# Patient Record
Sex: FEMALE | Race: WHITE | NOT HISPANIC OR LATINO | URBAN - METROPOLITAN AREA
[De-identification: names, ages, dates, MRNs, and addresses within clinical notes are randomized per-mention and may not be internally consistent; named-entity substitution may affect disease eponyms.]

---

## 2017-05-20 ENCOUNTER — EMERGENCY (EMERGENCY)
Facility: HOSPITAL | Age: 77
LOS: 1 days | Discharge: HOME | End: 2017-05-20
Attending: EMERGENCY MEDICINE
Payer: MEDICARE

## 2017-05-20 VITALS
SYSTOLIC BLOOD PRESSURE: 226 MMHG | TEMPERATURE: 98 F | HEART RATE: 83 BPM | DIASTOLIC BLOOD PRESSURE: 98 MMHG | OXYGEN SATURATION: 95 % | RESPIRATION RATE: 18 BRPM | WEIGHT: 145.06 LBS | HEIGHT: 63 IN

## 2017-05-20 DIAGNOSIS — Z90.89 ACQUIRED ABSENCE OF OTHER ORGANS: ICD-10-CM

## 2017-05-20 DIAGNOSIS — I10 ESSENTIAL (PRIMARY) HYPERTENSION: ICD-10-CM

## 2017-05-20 DIAGNOSIS — Z90.49 ACQUIRED ABSENCE OF OTHER SPECIFIED PARTS OF DIGESTIVE TRACT: ICD-10-CM

## 2017-05-20 DIAGNOSIS — Z88.0 ALLERGY STATUS TO PENICILLIN: ICD-10-CM

## 2017-05-20 DIAGNOSIS — Z88.8 ALLERGY STATUS TO OTHER DRUGS, MEDICAMENTS AND BIOLOGICAL SUBSTANCES STATUS: ICD-10-CM

## 2017-05-20 DIAGNOSIS — Z79.82 LONG TERM (CURRENT) USE OF ASPIRIN: ICD-10-CM

## 2017-05-20 DIAGNOSIS — R51 HEADACHE: ICD-10-CM

## 2017-05-20 DIAGNOSIS — Z98.890 OTHER SPECIFIED POSTPROCEDURAL STATES: ICD-10-CM

## 2017-05-20 LAB
ALBUMIN SERPL ELPH-MCNC: 4.5 G/DL — SIGNIFICANT CHANGE UP (ref 3.3–5.2)
ALP SERPL-CCNC: 98 U/L — SIGNIFICANT CHANGE UP (ref 40–120)
ALT FLD-CCNC: 38 U/L — HIGH
ANION GAP SERPL CALC-SCNC: 14 MMOL/L — SIGNIFICANT CHANGE UP (ref 5–17)
AST SERPL-CCNC: 28 U/L — SIGNIFICANT CHANGE UP
BASOPHILS # BLD AUTO: 0 K/UL — SIGNIFICANT CHANGE UP (ref 0–0.2)
BASOPHILS NFR BLD AUTO: 0.4 % — SIGNIFICANT CHANGE UP (ref 0–2)
BILIRUB SERPL-MCNC: 0.4 MG/DL — SIGNIFICANT CHANGE UP (ref 0.4–2)
BUN SERPL-MCNC: 25 MG/DL — HIGH (ref 8–20)
CALCIUM SERPL-MCNC: 9.8 MG/DL — SIGNIFICANT CHANGE UP (ref 8.6–10.2)
CHLORIDE SERPL-SCNC: 102 MMOL/L — SIGNIFICANT CHANGE UP (ref 98–107)
CO2 SERPL-SCNC: 26 MMOL/L — SIGNIFICANT CHANGE UP (ref 22–29)
CREAT SERPL-MCNC: 0.85 MG/DL — SIGNIFICANT CHANGE UP (ref 0.5–1.3)
EOSINOPHIL # BLD AUTO: 0.3 K/UL — SIGNIFICANT CHANGE UP (ref 0–0.5)
EOSINOPHIL NFR BLD AUTO: 3.6 % — SIGNIFICANT CHANGE UP (ref 0–6)
GLUCOSE SERPL-MCNC: 120 MG/DL — HIGH (ref 70–115)
HCT VFR BLD CALC: 42.5 % — SIGNIFICANT CHANGE UP (ref 37–47)
HGB BLD-MCNC: 14.2 G/DL — SIGNIFICANT CHANGE UP (ref 12–16)
LYMPHOCYTES # BLD AUTO: 2.3 K/UL — SIGNIFICANT CHANGE UP (ref 1–4.8)
LYMPHOCYTES # BLD AUTO: 29.3 % — SIGNIFICANT CHANGE UP (ref 20–55)
MCHC RBC-ENTMCNC: 30.1 PG — SIGNIFICANT CHANGE UP (ref 27–31)
MCHC RBC-ENTMCNC: 33.4 G/DL — SIGNIFICANT CHANGE UP (ref 32–36)
MCV RBC AUTO: 90.2 FL — SIGNIFICANT CHANGE UP (ref 81–99)
MONOCYTES # BLD AUTO: 0.9 K/UL — HIGH (ref 0–0.8)
MONOCYTES NFR BLD AUTO: 10.9 % — HIGH (ref 3–10)
NEUTROPHILS # BLD AUTO: 4.4 K/UL — SIGNIFICANT CHANGE UP (ref 1.8–8)
NEUTROPHILS NFR BLD AUTO: 55.5 % — SIGNIFICANT CHANGE UP (ref 37–73)
PLATELET # BLD AUTO: 214 K/UL — SIGNIFICANT CHANGE UP (ref 150–400)
POTASSIUM SERPL-MCNC: 3.6 MMOL/L — SIGNIFICANT CHANGE UP (ref 3.5–5.3)
POTASSIUM SERPL-SCNC: 3.6 MMOL/L — SIGNIFICANT CHANGE UP (ref 3.5–5.3)
PROT SERPL-MCNC: 6.8 G/DL — SIGNIFICANT CHANGE UP (ref 6.6–8.7)
RBC # BLD: 4.71 M/UL — SIGNIFICANT CHANGE UP (ref 4.4–5.2)
RBC # FLD: 14 % — SIGNIFICANT CHANGE UP (ref 11–15.6)
SODIUM SERPL-SCNC: 142 MMOL/L — SIGNIFICANT CHANGE UP (ref 135–145)
TROPONIN T SERPL-MCNC: <0.01 NG/ML — SIGNIFICANT CHANGE UP (ref 0–0.06)
WBC # BLD: 7.9 K/UL — SIGNIFICANT CHANGE UP (ref 4.8–10.8)
WBC # FLD AUTO: 7.9 K/UL — SIGNIFICANT CHANGE UP (ref 4.8–10.8)

## 2017-05-20 PROCEDURE — 99285 EMERGENCY DEPT VISIT HI MDM: CPT

## 2017-05-20 PROCEDURE — 93010 ELECTROCARDIOGRAM REPORT: CPT

## 2017-05-20 NOTE — ED PROVIDER NOTE - PROGRESS NOTE DETAILS
pt had transient lightheadedness after getting hydralazine 50mg so pt advised to resume current med dosage and f/u cardio for repeat BP to see if pt needs med adjstment. pt advised to be admitted for increased qtc interval despite being asymptomatic but pt still refuses. pt walking without lightheadedness. pt has no si/sx of arrhythmia but pt was advised to f/u for prolonged qtc of 506ms and discuss continued beta blocker use with cardio. will d/c home. pt advised she could die from arrhythmia

## 2017-05-20 NOTE — ED PROVIDER NOTE - OBJECTIVE STATEMENT
76 y/o F pt w/ PMHx of HTN presents to the ED c/o headache onset at 17:00 today. Pt ate something but still did not feel right. Pt took her BP at 20:00 and noted it to be 200/70; pt took 2 additional Hydralazine to no relief. Pt's headache has improved. Pt also notes GOTTI today while walking up stairs. Pt denies chest pain, numbness/tingling, focal weakness, blurred vision, slurred speech, or any other complaints. 78 y/o F pt w/ PMHx of HTN presents to the ED c/o headache onset at 17:00 today. Pt ate something but still did not feel right. Pt took her BP at 20:00 and noted it to be 200/70; pt took 2 additional Hydralazine to no relief. Pt's headache has improved. Pt also notes GOTTI today while walking up stairs. Pt denies chest pain, numbness/tingling, focal weakness, blurred vision, slurred speech, or any other complaints. Pt did not take her nightly BP meds.

## 2017-05-20 NOTE — ED PROVIDER NOTE - NS ED MD SCRIBE ATTENDING SCRIBE SECTIONS
PHYSICAL EXAM/PAST MEDICAL/SURGICAL/SOCIAL HISTORY/VITAL SIGNS( Pullset)/HISTORY OF PRESENT ILLNESS/REVIEW OF SYSTEMS/DISPOSITION

## 2017-05-20 NOTE — ED PROVIDER NOTE - CARE PLAN
Principal Discharge DX:	Nonintractable headache, unspecified chronicity pattern, unspecified headache type  Secondary Diagnosis:	Essential hypertension

## 2017-05-21 VITALS
OXYGEN SATURATION: 95 % | TEMPERATURE: 97 F | DIASTOLIC BLOOD PRESSURE: 66 MMHG | SYSTOLIC BLOOD PRESSURE: 154 MMHG | RESPIRATION RATE: 16 BRPM | HEART RATE: 71 BPM

## 2017-05-21 DIAGNOSIS — Z92.89 PERSONAL HISTORY OF OTHER MEDICAL TREATMENT: Chronic | ICD-10-CM

## 2017-05-21 DIAGNOSIS — Z90.49 ACQUIRED ABSENCE OF OTHER SPECIFIED PARTS OF DIGESTIVE TRACT: Chronic | ICD-10-CM

## 2017-05-21 PROCEDURE — 70496 CT ANGIOGRAPHY HEAD: CPT | Mod: 26

## 2017-05-21 PROCEDURE — 85027 COMPLETE CBC AUTOMATED: CPT

## 2017-05-21 PROCEDURE — 99284 EMERGENCY DEPT VISIT MOD MDM: CPT | Mod: 25

## 2017-05-21 PROCEDURE — 70496 CT ANGIOGRAPHY HEAD: CPT

## 2017-05-21 PROCEDURE — 84484 ASSAY OF TROPONIN QUANT: CPT

## 2017-05-21 PROCEDURE — 93005 ELECTROCARDIOGRAM TRACING: CPT

## 2017-05-21 PROCEDURE — 71010: CPT | Mod: 26

## 2017-05-21 PROCEDURE — 71045 X-RAY EXAM CHEST 1 VIEW: CPT

## 2017-05-21 PROCEDURE — 80053 COMPREHEN METABOLIC PANEL: CPT

## 2017-05-21 RX ORDER — LABETALOL HCL 100 MG
200 TABLET ORAL ONCE
Qty: 0 | Refills: 0 | Status: COMPLETED | OUTPATIENT
Start: 2017-05-21 | End: 2017-05-21

## 2017-05-21 RX ORDER — HYDRALAZINE HCL 50 MG
50 TABLET ORAL ONCE
Qty: 0 | Refills: 0 | Status: COMPLETED | OUTPATIENT
Start: 2017-05-21 | End: 2017-05-21

## 2017-05-21 RX ADMIN — Medication 50 MILLIGRAM(S): at 02:07

## 2017-05-21 RX ADMIN — Medication 200 MILLIGRAM(S): at 02:07

## 2017-06-20 ENCOUNTER — INPATIENT (INPATIENT)
Facility: HOSPITAL | Age: 77
LOS: 0 days | Discharge: HOME | DRG: 287 | End: 2017-06-20
Attending: HOSPITALIST | Admitting: HOSPITALIST
Payer: COMMERCIAL

## 2017-06-20 VITALS
RESPIRATION RATE: 16 BRPM | DIASTOLIC BLOOD PRESSURE: 60 MMHG | SYSTOLIC BLOOD PRESSURE: 120 MMHG | OXYGEN SATURATION: 95 % | HEART RATE: 58 BPM | TEMPERATURE: 98 F

## 2017-06-20 VITALS
RESPIRATION RATE: 18 BRPM | TEMPERATURE: 98 F | HEIGHT: 63 IN | DIASTOLIC BLOOD PRESSURE: 83 MMHG | OXYGEN SATURATION: 97 % | SYSTOLIC BLOOD PRESSURE: 190 MMHG | HEART RATE: 70 BPM | WEIGHT: 145.06 LBS

## 2017-06-20 DIAGNOSIS — I20.0 UNSTABLE ANGINA: ICD-10-CM

## 2017-06-20 DIAGNOSIS — I20.9 ANGINA PECTORIS, UNSPECIFIED: ICD-10-CM

## 2017-06-20 DIAGNOSIS — Z90.49 ACQUIRED ABSENCE OF OTHER SPECIFIED PARTS OF DIGESTIVE TRACT: Chronic | ICD-10-CM

## 2017-06-20 DIAGNOSIS — E78.5 HYPERLIPIDEMIA, UNSPECIFIED: ICD-10-CM

## 2017-06-20 DIAGNOSIS — I10 ESSENTIAL (PRIMARY) HYPERTENSION: ICD-10-CM

## 2017-06-20 DIAGNOSIS — Z92.89 PERSONAL HISTORY OF OTHER MEDICAL TREATMENT: Chronic | ICD-10-CM

## 2017-06-20 LAB
ALBUMIN SERPL ELPH-MCNC: 4.4 G/DL — SIGNIFICANT CHANGE UP (ref 3.3–5.2)
ALP SERPL-CCNC: 88 U/L — SIGNIFICANT CHANGE UP (ref 40–120)
ALT FLD-CCNC: 189 U/L — HIGH
ANION GAP SERPL CALC-SCNC: 14 MMOL/L — SIGNIFICANT CHANGE UP (ref 5–17)
APTT BLD: 44.8 SEC — HIGH (ref 27.5–37.4)
AST SERPL-CCNC: 106 U/L — HIGH
BASOPHILS # BLD AUTO: 0 K/UL — SIGNIFICANT CHANGE UP (ref 0–0.2)
BASOPHILS NFR BLD AUTO: 0.4 % — SIGNIFICANT CHANGE UP (ref 0–2)
BILIRUB SERPL-MCNC: 0.5 MG/DL — SIGNIFICANT CHANGE UP (ref 0.4–2)
BUN SERPL-MCNC: 18 MG/DL — SIGNIFICANT CHANGE UP (ref 8–20)
CALCIUM SERPL-MCNC: 9.7 MG/DL — SIGNIFICANT CHANGE UP (ref 8.6–10.2)
CHLORIDE SERPL-SCNC: 101 MMOL/L — SIGNIFICANT CHANGE UP (ref 98–107)
CO2 SERPL-SCNC: 26 MMOL/L — SIGNIFICANT CHANGE UP (ref 22–29)
CREAT SERPL-MCNC: 0.87 MG/DL — SIGNIFICANT CHANGE UP (ref 0.5–1.3)
EOSINOPHIL # BLD AUTO: 0.1 K/UL — SIGNIFICANT CHANGE UP (ref 0–0.5)
EOSINOPHIL NFR BLD AUTO: 2.4 % — SIGNIFICANT CHANGE UP (ref 0–6)
GLUCOSE SERPL-MCNC: 101 MG/DL — SIGNIFICANT CHANGE UP (ref 70–115)
HCT VFR BLD CALC: 40 % — SIGNIFICANT CHANGE UP (ref 37–47)
HGB BLD-MCNC: 13 G/DL — SIGNIFICANT CHANGE UP (ref 12–16)
INR BLD: 0.96 RATIO — SIGNIFICANT CHANGE UP (ref 0.88–1.16)
LYMPHOCYTES # BLD AUTO: 1.2 K/UL — SIGNIFICANT CHANGE UP (ref 1–4.8)
LYMPHOCYTES # BLD AUTO: 23.3 % — SIGNIFICANT CHANGE UP (ref 20–55)
MCHC RBC-ENTMCNC: 29.2 PG — SIGNIFICANT CHANGE UP (ref 27–31)
MCHC RBC-ENTMCNC: 32.5 G/DL — SIGNIFICANT CHANGE UP (ref 32–36)
MCV RBC AUTO: 89.9 FL — SIGNIFICANT CHANGE UP (ref 81–99)
MONOCYTES # BLD AUTO: 0.5 K/UL — SIGNIFICANT CHANGE UP (ref 0–0.8)
MONOCYTES NFR BLD AUTO: 8.6 % — SIGNIFICANT CHANGE UP (ref 3–10)
NEUTROPHILS # BLD AUTO: 3.5 K/UL — SIGNIFICANT CHANGE UP (ref 1.8–8)
NEUTROPHILS NFR BLD AUTO: 65.1 % — SIGNIFICANT CHANGE UP (ref 37–73)
PLATELET # BLD AUTO: 175 K/UL — SIGNIFICANT CHANGE UP (ref 150–400)
POTASSIUM SERPL-MCNC: 4 MMOL/L — SIGNIFICANT CHANGE UP (ref 3.5–5.3)
POTASSIUM SERPL-SCNC: 4 MMOL/L — SIGNIFICANT CHANGE UP (ref 3.5–5.3)
PROT SERPL-MCNC: 6.4 G/DL — LOW (ref 6.6–8.7)
PROTHROM AB SERPL-ACNC: 10.6 SEC — SIGNIFICANT CHANGE UP (ref 9.8–12.7)
RBC # BLD: 4.45 M/UL — SIGNIFICANT CHANGE UP (ref 4.4–5.2)
RBC # FLD: 14 % — SIGNIFICANT CHANGE UP (ref 11–15.6)
SODIUM SERPL-SCNC: 141 MMOL/L — SIGNIFICANT CHANGE UP (ref 135–145)
TROPONIN T SERPL-MCNC: <0.01 NG/ML — SIGNIFICANT CHANGE UP (ref 0–0.06)
WBC # BLD: 5.4 K/UL — SIGNIFICANT CHANGE UP (ref 4.8–10.8)
WBC # FLD AUTO: 5.4 K/UL — SIGNIFICANT CHANGE UP (ref 4.8–10.8)

## 2017-06-20 PROCEDURE — 99223 1ST HOSP IP/OBS HIGH 75: CPT | Mod: AI

## 2017-06-20 PROCEDURE — 96374 THER/PROPH/DIAG INJ IV PUSH: CPT

## 2017-06-20 PROCEDURE — C1894: CPT

## 2017-06-20 PROCEDURE — 93005 ELECTROCARDIOGRAM TRACING: CPT

## 2017-06-20 PROCEDURE — 85610 PROTHROMBIN TIME: CPT

## 2017-06-20 PROCEDURE — 85730 THROMBOPLASTIN TIME PARTIAL: CPT

## 2017-06-20 PROCEDURE — C1887: CPT

## 2017-06-20 PROCEDURE — 84484 ASSAY OF TROPONIN QUANT: CPT

## 2017-06-20 PROCEDURE — 99284 EMERGENCY DEPT VISIT MOD MDM: CPT | Mod: 25

## 2017-06-20 PROCEDURE — 93010 ELECTROCARDIOGRAM REPORT: CPT

## 2017-06-20 PROCEDURE — 93458 L HRT ARTERY/VENTRICLE ANGIO: CPT

## 2017-06-20 PROCEDURE — 85027 COMPLETE CBC AUTOMATED: CPT

## 2017-06-20 PROCEDURE — 99285 EMERGENCY DEPT VISIT HI MDM: CPT

## 2017-06-20 PROCEDURE — 80053 COMPREHEN METABOLIC PANEL: CPT

## 2017-06-20 PROCEDURE — C1769: CPT

## 2017-06-20 PROCEDURE — C1760: CPT

## 2017-06-20 PROCEDURE — 71045 X-RAY EXAM CHEST 1 VIEW: CPT

## 2017-06-20 PROCEDURE — 71010: CPT | Mod: 26

## 2017-06-20 RX ORDER — CHOLECALCIFEROL (VITAMIN D3) 125 MCG
1 CAPSULE ORAL
Qty: 0 | Refills: 0 | COMMUNITY

## 2017-06-20 RX ORDER — HYDRALAZINE HCL 50 MG
5 TABLET ORAL ONCE
Qty: 0 | Refills: 0 | Status: COMPLETED | OUTPATIENT
Start: 2017-06-20 | End: 2017-06-20

## 2017-06-20 RX ORDER — POTASSIUM CHLORIDE 20 MEQ
3 PACKET (EA) ORAL
Qty: 0 | Refills: 0 | COMMUNITY

## 2017-06-20 RX ORDER — NITROGLYCERIN 6.5 MG
0.3 CAPSULE, EXTENDED RELEASE ORAL
Qty: 0 | Refills: 0 | Status: DISCONTINUED | OUTPATIENT
Start: 2017-06-20 | End: 2017-06-20

## 2017-06-20 RX ORDER — NITROGLYCERIN 6.5 MG
1 CAPSULE, EXTENDED RELEASE ORAL ONCE
Qty: 0 | Refills: 0 | Status: COMPLETED | OUTPATIENT
Start: 2017-06-20 | End: 2017-06-20

## 2017-06-20 RX ORDER — ASPIRIN/CALCIUM CARB/MAGNESIUM 324 MG
0 TABLET ORAL
Qty: 0 | Refills: 0 | COMMUNITY

## 2017-06-20 RX ORDER — ACETAMINOPHEN 500 MG
650 TABLET ORAL ONCE
Qty: 0 | Refills: 0 | Status: COMPLETED | OUTPATIENT
Start: 2017-06-20 | End: 2017-06-20

## 2017-06-20 RX ORDER — OMEGA-3 ACID ETHYL ESTERS 1 G
1 CAPSULE ORAL
Qty: 0 | Refills: 0 | COMMUNITY

## 2017-06-20 RX ORDER — UBIDECARENONE 100 MG
1 CAPSULE ORAL
Qty: 0 | Refills: 0 | COMMUNITY

## 2017-06-20 RX ORDER — OMEGA-3 ACID ETHYL ESTERS 1 G
0 CAPSULE ORAL
Qty: 0 | Refills: 0 | COMMUNITY

## 2017-06-20 RX ORDER — LABETALOL HCL 100 MG
1 TABLET ORAL
Qty: 0 | Refills: 0 | COMMUNITY

## 2017-06-20 RX ORDER — ENOXAPARIN SODIUM 100 MG/ML
40 INJECTION SUBCUTANEOUS EVERY 24 HOURS
Qty: 0 | Refills: 0 | Status: DISCONTINUED | OUTPATIENT
Start: 2017-06-20 | End: 2017-06-20

## 2017-06-20 RX ORDER — MAGNESIUM OXIDE 400 MG ORAL TABLET 241.3 MG
1 TABLET ORAL
Qty: 0 | Refills: 0 | COMMUNITY

## 2017-06-20 RX ORDER — POTASSIUM CHLORIDE 20 MEQ
0 PACKET (EA) ORAL
Qty: 0 | Refills: 0 | COMMUNITY

## 2017-06-20 RX ORDER — FAMOTIDINE 10 MG/ML
1 INJECTION INTRAVENOUS
Qty: 60 | Refills: 0 | OUTPATIENT
Start: 2017-06-20 | End: 2017-07-20

## 2017-06-20 RX ORDER — LABETALOL HCL 100 MG
200 TABLET ORAL
Qty: 0 | Refills: 0 | Status: DISCONTINUED | OUTPATIENT
Start: 2017-06-20 | End: 2017-06-20

## 2017-06-20 RX ORDER — ASPIRIN/CALCIUM CARB/MAGNESIUM 324 MG
325 TABLET ORAL DAILY
Qty: 0 | Refills: 0 | Status: DISCONTINUED | OUTPATIENT
Start: 2017-06-20 | End: 2017-06-20

## 2017-06-20 RX ORDER — SODIUM CHLORIDE 9 MG/ML
1000 INJECTION, SOLUTION INTRAVENOUS
Qty: 0 | Refills: 0 | Status: DISCONTINUED | OUTPATIENT
Start: 2017-06-20 | End: 2017-06-20

## 2017-06-20 RX ORDER — GARLIC 1000 MG
1 CAPSULE ORAL
Qty: 0 | Refills: 0 | COMMUNITY

## 2017-06-20 RX ORDER — HYDRALAZINE HCL 50 MG
20 TABLET ORAL
Qty: 0 | Refills: 0 | COMMUNITY

## 2017-06-20 RX ORDER — SODIUM CHLORIDE 9 MG/ML
3 INJECTION INTRAMUSCULAR; INTRAVENOUS; SUBCUTANEOUS ONCE
Qty: 0 | Refills: 0 | Status: COMPLETED | OUTPATIENT
Start: 2017-06-20 | End: 2017-06-20

## 2017-06-20 RX ORDER — LABETALOL HCL 100 MG
10 TABLET ORAL ONCE
Qty: 0 | Refills: 0 | Status: COMPLETED | OUTPATIENT
Start: 2017-06-20 | End: 2017-06-20

## 2017-06-20 RX ORDER — CANDESARTAN CILEXETIL 8 MG/1
1 TABLET ORAL
Qty: 0 | Refills: 0 | COMMUNITY

## 2017-06-20 RX ORDER — HYDRALAZINE HCL 50 MG
40 TABLET ORAL
Qty: 0 | Refills: 0 | COMMUNITY

## 2017-06-20 RX ADMIN — Medication 10 MILLIGRAM(S): at 13:13

## 2017-06-20 RX ADMIN — Medication 5 MILLIGRAM(S): at 15:57

## 2017-06-20 RX ADMIN — Medication 650 MILLIGRAM(S): at 23:02

## 2017-06-20 RX ADMIN — Medication 200 MILLIGRAM(S): at 17:23

## 2017-06-20 RX ADMIN — SODIUM CHLORIDE 3 MILLILITER(S): 9 INJECTION INTRAMUSCULAR; INTRAVENOUS; SUBCUTANEOUS at 13:15

## 2017-06-20 RX ADMIN — Medication 5 MILLIGRAM(S): at 17:03

## 2017-06-20 RX ADMIN — Medication 1 INCH(S): at 13:13

## 2017-06-20 NOTE — DISCHARGE NOTE ADULT - PATIENT PORTAL LINK FT
“You can access the Follow[employer] Patient Portal, offered by [facility], by registering with the following website: [email or web address]”

## 2017-06-20 NOTE — H&P ADULT - HISTORY OF PRESENT ILLNESS
77 year old female ex- smoker present with central chest pressure, 8/10, non-radiating, initially thought to be 'gas' pain and not relieved by eructation and defecation. Associated with dyspnea as well as dizziness upon arising. Duncan palpitation, cough, fever or chills.  Denies orthopnea, pedal edema or PND.

## 2017-06-20 NOTE — CONSULT NOTE ADULT - SUBJECTIVE AND OBJECTIVE BOX
[facility], [facility]                                   [address] [place] [address]                                                      PHONE: [phone]                                                         FAX: [phone]  [email or web address]  ---------------------------------------------------------------------------------------------------------------------------------    Reason for Consult: chest pain HTN     HPI:  ALICIA NAIDU is an 77y Female with history of anxiety HTN uncontrolled HLD not on statins family history of CAD SHANNAN who presents with chest pressure that started this am around 9 am pressure that lasted about 20 to 30min then two more episodes lasting about 10 to 15 minutes.  The chest pain was associated with dyspnea without orthopnea or PND.  She denies any back pain or any other CV symptoms      PAST MEDICAL & SURGICAL HISTORY:  High cholesterol  HTN (hypertension)  Sleep apnea: cpap at home uses at home  History of bowel resection  History of cholecystectomy  History of appendectomy      azithromycin (Hives; Rash)  Exforge (Hives; Rash)  statins (Short breath)      MEDICATIONS  (STANDING):    MEDICATIONS  (PRN):      Social History:  Cigarettes:     ex smoker               Alchohol:       none          Illicit Drug Abuse:  none    ROS: Negative other than as mentioned in HPI.    Vital Signs Last 24 Hrs  T(C): 36.5, Max: 36.7 (06-20 @ 10:47)  T(F): 97.7, Max: 98 (06-20 @ 10:47)  HR: 76 (60 - 76)  BP: 179/75 (176/77 - 190/83)  BP(mean): --  RR: 16 (16 - 18)  SpO2: 98% (97% - 98%)  ICU Vital Signs Last 24 Hrs  ALICIA SCHINDLAR  I&O's Detail    I&O's Summary    Drug Dosing Weight  ALICIA NAIDU      PHYSICAL EXAM:  General: Appears well developed, well nourished alert and cooperative.  HEENT: Head; normocephalic, atraumatic.  Eyes: Pupils reactive, cornea wnl.  Neck: Supple, no nodes adenopathy, no NVD or carotid bruit or thyromegaly.  CARDIOVASCULAR: Normal S1 and S2, No murmur, rub, gallop or lift.   LUNGS: No rales, rhonchi or wheeze. Normal breath sounds bilaterally.  ABDOMEN: Soft, nontender without mass or organomegaly. bowel sounds normoactive.  EXTREMITIES: No clubbing, cyanosis or edema. Distal pulses wnl.   SKIN: warm and dry with normal turgor.  NEURO: Alert/oriented x 3/normal motor exam. No pathologic reflexes.    PSYCH: normal affect.        LABS:                        13.0   5.4   )-----------( 175      ( 20 Jun 2017 12:11 )             40.0     06-20    141  |  101  |  18.0  ----------------------------<  101  4.0   |  26.0  |  0.87    Ca    9.7      20 Jun 2017 12:11    TPro  6.4<L>  /  Alb  4.4  /  TBili  0.5  /  DBili  x   /  AST  106<H>  /  ALT  189<H>  /  AlkPhos  88  06-20    ALICIA NAIDU  CARDIAC MARKERS ( 20 Jun 2017 12:11 )  x     / <0.01 ng/mL / x     / x     / x          PT/INR - ( 20 Jun 2017 12:11 )   PT: 10.6 sec;   INR: 0.96 ratio         PTT - ( 20 Jun 2017 12:11 )  PTT:44.8 sec      RADIOLOGY & ADDITIONAL STUDIES:    INTERPRETATION OF TELEMETRY (personally reviewed):    ECG: NSR multiple PVC     ECHO: normal EF without any sig valvular disease from stress ECHO findings 2014    STRESS TEST: stress ECHO normal but poor functional activity 8.6 METS 80% MPHR     CARDIAC CATHETERIZATION: 2001 was ok as per patient     Assessment and Plan:    In summary, ALICIA NAIDU is an 77y Female with past medical history significant for HTN ex smoker HLD dose not tolerate statin who presents with typical chest pain HTN urgency; EKG NSR PVC without clear evidence of ischemia     Plan  1.  Left heart cath   2.  Continue Labetalol 200 mg BID but increase Hydralazine to 50 mg TID   3.  Start Norvasc 2.5 mg po daily     Further recommendation pending [facility] findings

## 2017-06-20 NOTE — PATIENT PROFILE ADULT. - FUNCTIONAL SCREEN CURRENT LEVEL: TOILETING, MLM
What is Carpal Tunnel Syndrome (CTS)?  Carpal tunnel syndrome (CTS) is a problem that affects the wrist and hand. If you have CTS, tingling and numbness can make even simple tasks hard to do. But CTS can be treated, and your symptoms can be controlled.    Learning about carpal tunnel  The carpal tunnel is a narrow space inside the wrist that is surrounded by bone and ligament. This space lets certain tendons and a major nerve pass from the forearm into the hand. With CTS, the tendon sheaths may thicken and enlarge. This reduces the amount of space inside the carpal tunnel. As a result, the median nerve may be compressed.  The symptoms of CTS  Tingling and numbness are the most common symptoms of CTS. Some people also have hand pain or even a weakened . At first, symptoms may wake you up at night. Later, they may also occur during your daily routines. For instance, you may notice symptoms while you are driving or holding a newspaper. Your symptoms may become more severe over time.     Symptoms of CTS may keep you up at night.    Working with your doctor  Your doctor will perform an exam to learn more about your symptoms. Once your problem is diagnosed, you and your doctor can make a treatment plan. He or she may recommend wrist braces or splints, local corticosteroid injections, and/or surgery, depending on your history and the severity of your physical exam findings. If you have surgery, you are likely to go home the same day.    4348-0716 [employer]. [address] [place], [place] [id number]. All rights reserved. This information is not intended as a substitute for professional medical care. Always follow your healthcare professional's instructions.        Carpal Tunnel Syndrome    Carpal tunnel syndrome is a painful condition of the wrist and arm. It is caused by pressure on the median nerve.  The median nerve is one of the nerves that give feeling and movement to the hand. It passes through a  tunnel in the wrist called the carpal tunnel. This tunnel is made up of bones and ligaments. Narrowing of this tunnel or swelling of the tissues inside the tunnel puts pressure on the median nerve. This causes numbness, pins and needles, or electric shooting pains in your hand and forearm. Often the pain is worse at night and may wake you when you are asleep.  Carpal tunnel syndrome may occur during pregnancy and with use of birth control pills. It is more common in workers who must often bend their wrists. It is also common in people who work with power tools that cause strong vibrations.  Home care    Rest the painful wrist. Avoid repeated bending of the wrist back and forth. This puts pressure on the median nerve. Avoid using power tools with strong vibrations.    If you were given a splint, wear it at night while you sleep. You may also wear it during the day for comfort.    Move your fingers and wrists often to avoid stiffness.    Elevate your arms on pillows when you lie down.    Try using the unaffected hand more.    Try not to hold your wrists in a bent, downward position.    Sometimes changes in the work place may ease symptoms. If you type most of the day, it may help to change the position of your keyboard or add a wrist support. Your wrist should be in a neutral position and not bent back when typing.    You may use over-the-counter pain medicine to treat pain and inflammation, unless another medicine was prescribed. Anti-inflammatory pain medicines, such as ibuprofen or naproxen may be more effective than acetaminophen, which treats pain, but not inflammation. If you have chronic liver or kidney disease or ever had a stomach ulcer or GI bleeding, talk with your doctor before using these medicines.    Opioid pain medicine will only give temporary relief and does not treat the problem. If pain continues, you may need a shot of a steroid drug into your wrist.    If the above methods fail, you may need  surgery. This will open the carpal tunnel and release the pressure on the trapped nerve.  Follow-up care  Follow up with your healthcare provider, or as advised, if the pain doesn t begin to improve within the next week.  If X-rays were taken, you will be notified of any new findings that may affect your care.  When to seek medical advice  Call your healthcare provider right away if any of these occur:    Pain not improving with the above treatment    Fingers or hand become cold, blue, numb, or tingly    Your whole arm becomes swollen or weak    5310-4997 [employer]. [address] [place] [address]. All rights reserved. This information is not intended as a substitute for professional medical care. Always follow your healthcare professional's instructions.        Carpal Tunnel Syndrome Prevention Tips  Some repetitive hand activities put you at higher risk for carpal tunnel syndrome (CTS). But you can reduce your risk. Learn how to change the way you use your hands. Below are tips for at home and on the job. Be sure to also follow the hand and wrist safety policies at your workplace.  Keep your wrist in neutral    Keep a neutral (straight) wrist position as often as you can. Don t use your wrist in a bent (flexed) position for long periods of time. This includes extended or twisted positions.  Watch your   Don t just use your thumb and index finger to grasp or lift. This can put stress on your wrist. When you can, use your whole hand and all its fingers to grasp an object.  Minimize repetition  Don t move your arms or hands or hold an object in the same way for long periods of time. Even simple, light tasks can cause injury this way. Instead, alternate tasks or switch hands.  Rest your hands  Give your hands a break from time to time with a rest. Even a few minutes once an hour can help.  Reduce speed and force  Slow down the speed in which you do a forceful, repetitive motion. This  gives your wrist time to recover from the effort. Use power tools to help reduce the force.  Strengthen the muscles     Keep your wrist in a neutral (straight) position when exercising.     Weak muscles may lead to a poor wrist or arm position. Exercises will make your hand and arm muscles stronger. This can help you keep a better position.    0958-8419 [employer]. [address] [place] [address], [place] [address]. All rights reserved. This information is not intended as a substitute for professional medical care. Always follow your healthcare professional's instructions.         (0) independent

## 2017-06-20 NOTE — DISCHARGE NOTE ADULT - PLAN OF CARE
optimize cardiac health No heavy lifting, driving, sex, tub baths, swimming, or any activity that submerges the lower half of the body in water for 48 hours.  Limited walking and stairs for 48 hours.    Change the bandaid after 24 hours and every 24 hours after that.  Keep the puncture site dry and covered with a bandaid until a scab forms.    Observe the site frequently.  If bleeding or a large lump (the size of a golf ball or bigger) occurs lie flat, apply continuous direct pressure just above the puncture site for at least 10 minutes, and notify your physician immediately.  If the bleeding cannot be controlled, call 911 immediately for assistance.  Notify your physician of pain, swelling or any drainage.    Notify your physician immediately if coldness, numbness, discoloration or pain in your foot occurs.

## 2017-06-20 NOTE — ED PROVIDER NOTE - CHPI ED SYMPTOMS NEG
no vomiting/no syncope/no diaphoresis/no back pain/no fever/no chills/no nausea/no cough/no dizziness

## 2017-06-20 NOTE — DISCHARGE NOTE ADULT - CARE PLAN
Principal Discharge DX:	Angina pectoris  Goal:	optimize cardiac health  Instructions for follow-up, activity and diet:	No heavy lifting, driving, sex, tub baths, swimming, or any activity that submerges the lower half of the body in water for 48 hours.  Limited walking and stairs for 48 hours.    Change the bandaid after 24 hours and every 24 hours after that.  Keep the puncture site dry and covered with a bandaid until a scab forms.    Observe the site frequently.  If bleeding or a large lump (the size of a golf ball or bigger) occurs lie flat, apply continuous direct pressure just above the puncture site for at least 10 minutes, and notify your physician immediately.  If the bleeding cannot be controlled, call 911 immediately for assistance.  Notify your physician of pain, swelling or any drainage.    Notify your physician immediately if coldness, numbness, discoloration or pain in your foot occurs.

## 2017-06-20 NOTE — H&P ADULT - ASSESSMENT
77 year old female ex-smoker with PMHx HTN, Dyslipidemia presenting with chest pressure suggestive of angina 77 year old female ex-smoker with PMHx HTN, Dyslipidemia presenting with chest pressure suggestive of angina. Negative Troponin EKG in ER.

## 2017-06-20 NOTE — DISCHARGE NOTE ADULT - CARE PROVIDER_API CALL
Murali Molina (MD), Cardiovascular Disease; Interventional Cardiology  [address] [place] [address]  Phone: [phone]  Fax: [phone]

## 2017-06-20 NOTE — ED ADULT TRIAGE NOTE - CHIEF COMPLAINT QUOTE
BIBA, patient reports substernal epigastric chest pain described as 8-9/10 pressure that began at 0930, subsided en route to ER. Hx HTN. EMS administered ASA 324mg en route

## 2017-06-20 NOTE — ED ADULT NURSE NOTE - OBJECTIVE STATEMENT
78 y/o female presents to ed reporting midsternal chest pain/chest pressure that started at approximately 0930 this morning. Pain started while ambulating. Reports that pain felt like indigestion unrelieved by belching and bowel movement. Patient reports no associated symptoms of radiating pain/diaphoresis/nausea. Patient reports was here and had bp meds adjusted for hypertension and has been monitoring bp at home sbp in 150s typically. hypertensive here in ed. patient reports bp at home sbp 200s. on assessment patient awake alert and oriented x3. respirations even and unlabored. lungs clear. abdomen soft non-tender. no swelling to bilateral lower extremities. + pedal pulses bilaterally. patient nsr on cardiac monitor. vitals as documented. iv placed in triage. patient and family at bedside updated regarding plan of care. verbalized understanding. will cont to monitor.

## 2017-06-20 NOTE — ED PROVIDER NOTE - MEDICAL DECISION MAKING DETAILS
The patient has CP concerning for ACS and Cardiology wants to cardiac cath the patient.  Will admit for further evaluation

## 2017-06-20 NOTE — DISCHARGE NOTE ADULT - MEDICATION SUMMARY - MEDICATIONS TO TAKE
I will START or STAY ON the medications listed below when I get home from the hospital:    aspirin 81 mg oral tablet  --  by mouth   -- Indication: For Heart health    Atacand 32 mg oral tablet  -- 1 tab(s) by mouth once a day  -- Indication: For bp    labetalol 200 mg oral tablet  -- 1 tab(s) by mouth 2 times a day  -- Indication: For bp    garlic oral capsule  -- 1 tab(s) by mouth once a day (at bedtime)  -- Indication: For suppliment    K-Dur 10  -- 3 tab(s) by mouth once a day  -- Indication: For suppliment    magnesium oxide 250 mg oral tablet  -- 1 tab(s) by mouth 2 times a day  -- Indication: For suppliment    Lovaza 1000 mg oral capsule  -- 1 cap(s) by mouth 2 times a day  -- Indication: For chol    Co Q-10 100 mg oral capsule  -- 1 cap(s) by mouth 1 times a day (3 days a week)  -- Indication: For chol    red yeast rice 600 mg oral capsule  -- 1 cap(s) by mouth 2 times a day  -- Indication: For chol    hydrALAZINE  -- 40 milligram(s) by mouth 3 times a day  -- Indication: For bp    multivitamin  -- 1 tab(s) by mouth once a day  -- Indication: For vitamin    Calcium 600+D oral tablet  -- 1 tab(s) by mouth 2 times a day  -- Indication: For vitamin    Vitamin D3 1000 intl units oral capsule  -- 1 cap(s) by mouth once a day  -- Indication: For suppliment I will START or STAY ON the medications listed below when I get home from the hospital:    aspirin 81 mg oral tablet  --  by mouth   -- Indication: For Heart health    Atacand 32 mg oral tablet  -- 1 tab(s) by mouth once a day  -- Indication: For bp    Mylanta oral suspension  -- 10 milliliter(s) by mouth 4 times a day (after meals and at bedtime)  -- Indication: For Dyspepsia    labetalol 200 mg oral tablet  -- 1 tab(s) by mouth 2 times a day  -- Indication: For bp    garlic oral capsule  -- 1 tab(s) by mouth once a day (at bedtime)  -- Indication: For suppliment    K-Dur 10  -- 3 tab(s) by mouth once a day  -- Indication: For suppliment    magnesium oxide 250 mg oral tablet  -- 1 tab(s) by mouth 2 times a day  -- Indication: For suppliment    Lovaza 1000 mg oral capsule  -- 1 cap(s) by mouth 2 times a day  -- Indication: For chol    Co Q-10 100 mg oral capsule  -- 1 cap(s) by mouth 1 times a day (3 days a week)  -- Indication: For chol    red yeast rice 600 mg oral capsule  -- 1 cap(s) by mouth 2 times a day  -- Indication: For chol    hydrALAZINE  -- 40 milligram(s) by mouth 3 times a day  -- Indication: For bp    multivitamin  -- 1 tab(s) by mouth once a day  -- Indication: For vitamin    Calcium 600+D oral tablet  -- 1 tab(s) by mouth 2 times a day  -- Indication: For vitamin    Vitamin D3 1000 intl units oral capsule  -- 1 cap(s) by mouth once a day  -- Indication: For suppliment

## 2017-06-20 NOTE — ED PROVIDER NOTE - CARE PLAN
Principal Discharge DX:	Unstable angina  Secondary Diagnosis:	High cholesterol  Secondary Diagnosis:	HTN (hypertension)

## 2017-06-20 NOTE — ED ADULT NURSE REASSESSMENT NOTE - NS ED NURSE REASSESS COMMENT FT1
pt still hypertensive; vitals as documented. meds given as ordered. will re-eval blood pressure. patient denies pain at this time. move hiram extremities. nsr on cardia cmonitor.

## 2017-06-20 NOTE — H&P ADULT - NSHPSOCIALHISTORY_GEN_ALL_CORE
, 4 children  Retired , lives alone, manages ADLs by self.  Quit smoking >29 years ago, no alcohol or drug use.

## 2017-06-20 NOTE — DISCHARGE NOTE ADULT - HOSPITAL COURSE
S/P cardiac cath normal cors normal LV   HTN heart disease follow up with [facility] and Dr Molina S/P cardiac cath normal cors normal LV   HTN heart disease follow up with PMD and Dr Molina.  Deja for dyspepsia as needed

## 2017-06-20 NOTE — ED PROVIDER NOTE - OBJECTIVE STATEMENT
The patient is a 77 year old female presents with chest pain described as heaviness with SOB. No nausea, No vomiting, No diaphoresis, Non-exertional, No fever, No cough, No abd/back pain, No motor, No sensory loss.

## 2017-06-20 NOTE — H&P ADULT - PROBLEM SELECTOR PLAN 1
Admit to telemtry  ASA, BB - No stain as allergy  NTG  Trend CE, EKG  TTE  Cardiology evaluation - plan for [facility]

## 2018-07-16 PROBLEM — G47.30 SLEEP APNEA, UNSPECIFIED: Chronic | Status: ACTIVE | Noted: 2017-05-21

## 2019-03-27 ENCOUNTER — RESULT REVIEW (OUTPATIENT)
Age: 79
End: 2019-03-27

## 2020-07-01 ENCOUNTER — TRANSCRIPTION ENCOUNTER (OUTPATIENT)
Age: 80
End: 2020-07-01

## 2020-07-23 ENCOUNTER — TRANSCRIPTION ENCOUNTER (OUTPATIENT)
Age: 80
End: 2020-07-23

## 2020-11-03 NOTE — DISCHARGE NOTE ADULT - MEDICATION SUMMARY - MEDICATIONS TO CHANGE
I will SWITCH the dose or number of times a day I take the medications listed below when I get home from the hospital:  None [1] : 1) Little interest or pleasure doing things for several days (1) [0] : 2) Feeling down, depressed, or hopeless: Not at all (0) [FreeTextEntry1] : concerned re wt gain [[id number]] : 1 [[id number]] : 5

## 2020-11-10 NOTE — ED PROVIDER NOTE - PSYCHIATRIC, MLM
A dressing is applied to the incision.  Alert and oriented to person, place, time/situation. normal mood and affect. no apparent risk to self or others.

## 2020-12-16 PROBLEM — I10 ESSENTIAL (PRIMARY) HYPERTENSION: Chronic | Status: ACTIVE | Noted: 2017-05-21

## 2020-12-16 PROBLEM — N20.0 CALCULUS OF KIDNEY: Chronic | Status: ACTIVE | Noted: 2017-06-20

## 2020-12-16 PROBLEM — E78.00 PURE HYPERCHOLESTEROLEMIA, UNSPECIFIED: Chronic | Status: ACTIVE | Noted: 2017-06-20

## 2020-12-22 ENCOUNTER — APPOINTMENT (OUTPATIENT)
Dept: PULMONOLOGY | Facility: CLINIC | Age: 80
End: 2020-12-22
Payer: MEDICARE

## 2020-12-22 VITALS — OXYGEN SATURATION: 97 % | HEART RATE: 56 BPM | RESPIRATION RATE: 16 BRPM

## 2020-12-22 VITALS — SYSTOLIC BLOOD PRESSURE: 140 MMHG | DIASTOLIC BLOOD PRESSURE: 60 MMHG | WEIGHT: 144 LBS | BODY MASS INDEX: 25.51 KG/M2

## 2020-12-22 DIAGNOSIS — I10 ESSENTIAL (PRIMARY) HYPERTENSION: ICD-10-CM

## 2020-12-22 DIAGNOSIS — E78.5 HYPERLIPIDEMIA, UNSPECIFIED: ICD-10-CM

## 2020-12-22 DIAGNOSIS — M19.90 UNSPECIFIED OSTEOARTHRITIS, UNSPECIFIED SITE: ICD-10-CM

## 2020-12-22 DIAGNOSIS — Z87.19 PERSONAL HISTORY OF OTHER DISEASES OF THE DIGESTIVE SYSTEM: ICD-10-CM

## 2020-12-22 DIAGNOSIS — Z82.5 FAMILY HISTORY OF ASTHMA AND OTHER CHRONIC LOWER RESPIRATORY DISEASES: ICD-10-CM

## 2020-12-22 DIAGNOSIS — K57.90 DIVERTICULOSIS OF INTESTINE, PART UNSPECIFIED, W/OUT PERFORATION OR ABSCESS W/OUT BLEEDING: ICD-10-CM

## 2020-12-22 DIAGNOSIS — Z80.1 FAMILY HISTORY OF MALIGNANT NEOPLASM OF TRACHEA, BRONCHUS AND LUNG: ICD-10-CM

## 2020-12-22 DIAGNOSIS — Z82.49 FAMILY HISTORY OF ISCHEMIC HEART DISEASE AND OTHER DISEASES OF THE CIRCULATORY SYSTEM: ICD-10-CM

## 2020-12-22 DIAGNOSIS — Z83.3 FAMILY HISTORY OF DIABETES MELLITUS: ICD-10-CM

## 2020-12-22 DIAGNOSIS — I34.1 NONRHEUMATIC MITRAL (VALVE) PROLAPSE: ICD-10-CM

## 2020-12-22 DIAGNOSIS — Z80.51 FAMILY HISTORY OF MALIGNANT NEOPLASM OF KIDNEY: ICD-10-CM

## 2020-12-22 DIAGNOSIS — E04.1 NONTOXIC SINGLE THYROID NODULE: ICD-10-CM

## 2020-12-22 DIAGNOSIS — N28.1 CYST OF KIDNEY, ACQUIRED: ICD-10-CM

## 2020-12-22 DIAGNOSIS — Z86.79 PERSONAL HISTORY OF OTHER DISEASES OF THE CIRCULATORY SYSTEM: ICD-10-CM

## 2020-12-22 DIAGNOSIS — Z87.891 PERSONAL HISTORY OF NICOTINE DEPENDENCE: ICD-10-CM

## 2020-12-22 PROCEDURE — 99214 OFFICE O/P EST MOD 30 MIN: CPT

## 2020-12-22 PROCEDURE — 99204 OFFICE O/P NEW MOD 45 MIN: CPT

## 2020-12-22 NOTE — HISTORY OF PRESENT ILLNESS
[Lab] : lab [APAP:] : APAP [Obstructive Sleep Apnea] : obstructive sleep apnea [Nasal mask] : nasal mask [Awakes Unrefreshed] : does not awaken unrefreshed [Awakes with Dry Mouth] : does not awaken with dry mouth [Awakes with Headache] : does not awaken with headache [Daytime Somnolence] : denies daytime somnolence [Nonrestorative Sleep] : denies nonrestorative sleep [Snoring] : no snoring [Tired while Driving] : not tired while driving [TextBox_77] : 1am [TextBox_79] : 8am [TextBox_81] : 15 [TextBox_89] : 1 [TextBox_100] : 9/20/16 [TextBox_108] : 90.2 [TextBox_104] : 10/26/16 [TextBox_125] : 5-20 [TextBox_127] : 11/21/20 [TextBox_129] : 112/21/20 [TextBox_133] : 100 [TextBox_137] : 100 [TextBox_141] : 7 [TextBox_143] : 39 [TextBox_147] : 1.2 [TextBox_158] : [employer] [TextBox_160] : N20 [TextBox_162] : 11/2016 [TextBox_164] : 12/2021 [TextBox_165] : Machine top to water chamber not closing\par [employer]pap

## 2020-12-22 NOTE — DISCUSSION/SUMMARY
[Obstructive Sleep Apnea] : obstructive sleep apnea [Severe] : severe [Responding to Treatment] : responding to treatment [Alcohol Avoidance] : alcohol avoidance [Sedative Avoidance] : sedative avoidance [Weight Loss Program] : weight loss program [CPAP] : CPAP [de-identified] : Patient compliant with CPAP/BiPAP and benefiting from therapy [de-identified] : Repair CPAP machine, renew supplies

## 2020-12-22 NOTE — CONSULT LETTER
[Dear  ___] : Dear  [unfilled], [Consult Letter:] : I had the pleasure of evaluating your patient, [unfilled]. [Please see my note below.] : Please see my note below. [Consult Closing:] : Thank you very much for allowing me to participate in the care of this patient.  If you have any questions, please do not hesitate to contact me. [Sincerely,] : Sincerely, [FreeTextEntry3] : Prakash Villarreal MD FCCP\par Pulmonary/Critical Care/Sleep Medicine\par Department of Internal Medicine\par \par [facility] [employer]\par

## 2021-05-10 ENCOUNTER — TRANSCRIPTION ENCOUNTER (OUTPATIENT)
Age: 81
End: 2021-05-10

## 2021-06-04 ENCOUNTER — APPOINTMENT (OUTPATIENT)
Dept: NEUROLOGY | Facility: CLINIC | Age: 81
End: 2021-06-04

## 2021-09-21 ENCOUNTER — APPOINTMENT (OUTPATIENT)
Dept: PULMONOLOGY | Facility: CLINIC | Age: 81
End: 2021-09-21
Payer: MEDICARE

## 2021-09-21 VITALS
BODY MASS INDEX: 25.33 KG/M2 | SYSTOLIC BLOOD PRESSURE: 140 MMHG | WEIGHT: 143 LBS | HEART RATE: 69 BPM | DIASTOLIC BLOOD PRESSURE: 60 MMHG | OXYGEN SATURATION: 96 %

## 2021-09-21 PROCEDURE — 99214 OFFICE O/P EST MOD 30 MIN: CPT

## 2021-09-21 RX ORDER — POTASSIUM CHLORIDE 10 MEQ
CAPSULE, EXTENDED RELEASE ORAL
Refills: 0 | Status: DISCONTINUED | COMMUNITY
End: 2021-09-21

## 2021-09-21 RX ORDER — RISEDRONATE SODIUM 129; 21 MG/1; MG/1
TABLET, FILM COATED ORAL
Refills: 0 | Status: DISCONTINUED | COMMUNITY
End: 2021-09-21

## 2021-09-21 RX ORDER — CEFDINIR 300 MG/1
300 CAPSULE ORAL
Qty: 30 | Refills: 0 | Status: DISCONTINUED | COMMUNITY
Start: 2021-08-10 | End: 2021-09-21

## 2021-09-21 RX ORDER — POTASSIUM CHLORIDE 1500 MG/1
20 TABLET, EXTENDED RELEASE ORAL
Qty: 180 | Refills: 0 | Status: ACTIVE | COMMUNITY
Start: 2021-08-17

## 2021-09-21 RX ORDER — ESTRADIOL 0.1 MG/G
0.1 CREAM VAGINAL
Qty: 42 | Refills: 0 | Status: ACTIVE | COMMUNITY
Start: 2021-08-03

## 2021-09-21 RX ORDER — CEFPODOXIME PROXETIL 200 MG/1
200 TABLET, FILM COATED ORAL
Qty: 30 | Refills: 0 | Status: DISCONTINUED | COMMUNITY
Start: 2021-06-17 | End: 2021-09-21

## 2021-09-21 RX ORDER — ALPRAZOLAM 0.25 MG/1
0.25 TABLET ORAL
Qty: 30 | Refills: 0 | Status: ACTIVE | COMMUNITY
Start: 2021-06-12

## 2021-09-21 NOTE — CONSULT LETTER
[Dear  ___] : Dear  [unfilled], [Consult Letter:] : I had the pleasure of evaluating your patient, [unfilled]. [Please see my note below.] : Please see my note below. [Consult Closing:] : Thank you very much for allowing me to participate in the care of this patient.  If you have any questions, please do not hesitate to contact me. [Sincerely,] : Sincerely, [FreeTextEntry3] : Prakash Villarreal MD FCCP\par Pulmonary/Critical Care/Sleep Medicine\par Department of Internal Medicine\par \par [facility] [employer]\par

## 2021-09-21 NOTE — DISCUSSION/SUMMARY
[Obstructive Sleep Apnea] : obstructive sleep apnea [Severe] : severe [Responding to Treatment] : responding to treatment [Alcohol Avoidance] : alcohol avoidance [Sedative Avoidance] : sedative avoidance [Weight Loss Program] : weight loss program [CPAP] : CPAP [de-identified] : Patient compliant with CPAP/BiPAP and benefiting from therapy [de-identified] : Replace CPAP with Resmed Airsense 10 autoset (for her) in a range 4-16 with N20 mask. Machine >5yrs old

## 2021-09-21 NOTE — HISTORY OF PRESENT ILLNESS
[Obstructive Sleep Apnea] : obstructive sleep apnea [Lab] : lab [APAP:] : APAP [Nasal mask] : nasal mask [Awakes Unrefreshed] : does not awaken unrefreshed [Awakes with Dry Mouth] : does not awaken with dry mouth [Awakes with Headache] : does not awaken with headache [Daytime Somnolence] : denies daytime somnolence [Nonrestorative Sleep] : denies nonrestorative sleep [Snoring] : no snoring [Tired while Driving] : not tired while driving [TextBox_77] : 1am [TextBox_79] : 8am [TextBox_81] : 15 [TextBox_89] : 1 [TextBox_100] : 9/20/16 [TextBox_108] : 90.2 [TextBox_104] : 10/26/16 [TextBox_125] : 5-20 [TextBox_127] : 7/1/21 [TextBox_129] : 9/15/21 [TextBox_133] : 100 [TextBox_137] : 100 [TextBox_141] : 7 [TextBox_143] : 39 [TextBox_147] : 0.1 [TextBox_158] : Adapt [TextBox_160] : N20 [TextBox_162] : 11/2016 [TextBox_164] : 12/2021 [TextBox_165] : Dream station Auto-pap\par Machine 2016 and needs replacement\par >5yrs old

## 2021-10-06 PROBLEM — I10 ESSENTIAL HYPERTENSION: Status: ACTIVE | Noted: 2020-12-22

## 2021-12-02 ENCOUNTER — APPOINTMENT (OUTPATIENT)
Dept: PULMONOLOGY | Facility: CLINIC | Age: 81
End: 2021-12-02
Payer: MEDICARE

## 2021-12-02 VITALS
WEIGHT: 145 LBS | HEART RATE: 74 BPM | BODY MASS INDEX: 25.69 KG/M2 | SYSTOLIC BLOOD PRESSURE: 120 MMHG | RESPIRATION RATE: 16 BRPM | DIASTOLIC BLOOD PRESSURE: 60 MMHG | OXYGEN SATURATION: 95 %

## 2021-12-02 PROCEDURE — 99214 OFFICE O/P EST MOD 30 MIN: CPT

## 2021-12-02 NOTE — DISCUSSION/SUMMARY
[Obstructive Sleep Apnea] : obstructive sleep apnea [Severe] : severe [Responding to Treatment] : responding to treatment [Alcohol Avoidance] : alcohol avoidance [Sedative Avoidance] : sedative avoidance [Weight Loss Program] : weight loss program [CPAP] : CPAP [Patient] : discussed with the patient [de-identified] : Patient compliant with CPAP/BiPAP and benefiting from therapy [de-identified] : Replace CPAP with Resmed Airsense 11 autoset (for her) in a range 4-16 with N20 mask. Machine >5yrs old

## 2021-12-02 NOTE — CONSULT LETTER
[Dear  ___] : Dear  [unfilled], [Consult Letter:] : I had the pleasure of evaluating your patient, [unfilled]. [Please see my note below.] : Please see my note below. [Consult Closing:] : Thank you very much for allowing me to participate in the care of this patient.  If you have any questions, please do not hesitate to contact me. [Sincerely,] : Sincerely, [FreeTextEntry3] : Prakash Villarreal MD FCCP\par Pulmonary/Critical Care/Sleep Medicine\par Department of Internal Medicine\par \par [facility] [employer]\par

## 2021-12-02 NOTE — HISTORY OF PRESENT ILLNESS
[Obstructive Sleep Apnea] : obstructive sleep apnea [Lab] : lab [APAP:] : APAP [Nasal mask] : nasal mask [Awakes Unrefreshed] : does not awaken unrefreshed [Awakes with Dry Mouth] : does not awaken with dry mouth [Awakes with Headache] : does not awaken with headache [Daytime Somnolence] : denies daytime somnolence [Nonrestorative Sleep] : denies nonrestorative sleep [Snoring] : no snoring [Tired while Driving] : not tired while driving [TextBox_77] : 1am [TextBox_79] : 8am [TextBox_81] : 15 [TextBox_89] : 1 [TextBox_100] : 9/20/16 [TextBox_108] : 90.2 [TextBox_104] : 10/26/16 [TextBox_125] : 5-20 [TextBox_127] : 9/3/21 [TextBox_129] : 11/30/21 [TextBox_133] : 100 [TextBox_137] : 100 [TextBox_141] : 8 [TextBox_143] : 4 [TextBox_147] : 1.2 [TextBox_158] : Adapt [TextBox_160] : N20 [TextBox_162] : 11/2016 [TextBox_164] : 12/2021 [TextBox_165] : Needs cPAP replaced\par >5yrs old

## 2022-04-07 ENCOUNTER — APPOINTMENT (OUTPATIENT)
Dept: PULMONOLOGY | Facility: CLINIC | Age: 82
End: 2022-04-07

## 2022-05-10 NOTE — HISTORY OF PRESENT ILLNESS
[FreeTextEntry1] : Ms. ALICIA NAIDU is a 82 year old female referred by  who presents for consultation regarding lung mass. \par \par Her pertinent past medical history includes bursitis, diverticulosis, hypertension, hyperlipidemia, mitral valve prolapse, sleep apnea, arthritis, renal cyst\par \par Today, the patient reports

## 2022-05-16 ENCOUNTER — APPOINTMENT (OUTPATIENT)
Dept: THORACIC SURGERY | Facility: CLINIC | Age: 82
End: 2022-05-16

## 2022-08-01 ENCOUNTER — EMERGENCY (EMERGENCY)
Facility: HOSPITAL | Age: 82
LOS: 1 days | Discharge: HOME | End: 2022-08-01
Attending: EMERGENCY MEDICINE
Payer: MEDICARE

## 2022-08-01 VITALS
DIASTOLIC BLOOD PRESSURE: 64 MMHG | OXYGEN SATURATION: 98 % | HEART RATE: 64 BPM | TEMPERATURE: 97 F | RESPIRATION RATE: 16 BRPM | SYSTOLIC BLOOD PRESSURE: 173 MMHG

## 2022-08-01 VITALS
SYSTOLIC BLOOD PRESSURE: 209 MMHG | HEART RATE: 70 BPM | WEIGHT: 145.06 LBS | OXYGEN SATURATION: 96 % | TEMPERATURE: 98 F | DIASTOLIC BLOOD PRESSURE: 71 MMHG | HEIGHT: 63 IN | RESPIRATION RATE: 18 BRPM

## 2022-08-01 DIAGNOSIS — Z90.49 ACQUIRED ABSENCE OF OTHER SPECIFIED PARTS OF DIGESTIVE TRACT: Chronic | ICD-10-CM

## 2022-08-01 DIAGNOSIS — Z90.710 ACQUIRED ABSENCE OF BOTH CERVIX AND UTERUS: Chronic | ICD-10-CM

## 2022-08-01 DIAGNOSIS — Z92.89 PERSONAL HISTORY OF OTHER MEDICAL TREATMENT: Chronic | ICD-10-CM

## 2022-08-01 LAB
ALBUMIN SERPL ELPH-MCNC: 3.9 G/DL — SIGNIFICANT CHANGE UP (ref 3.3–5.2)
ALP SERPL-CCNC: 120 U/L — SIGNIFICANT CHANGE UP (ref 40–120)
ALT FLD-CCNC: 54 U/L — HIGH
ANION GAP SERPL CALC-SCNC: 10 MMOL/L — SIGNIFICANT CHANGE UP (ref 5–17)
APPEARANCE UR: CLEAR — SIGNIFICANT CHANGE UP
AST SERPL-CCNC: 56 U/L — HIGH
BACTERIA # UR AUTO: ABNORMAL
BASOPHILS # BLD AUTO: 0.03 K/UL — SIGNIFICANT CHANGE UP (ref 0–0.2)
BASOPHILS NFR BLD AUTO: 0.4 % — SIGNIFICANT CHANGE UP (ref 0–2)
BILIRUB SERPL-MCNC: 0.4 MG/DL — SIGNIFICANT CHANGE UP (ref 0.4–2)
BILIRUB UR-MCNC: NEGATIVE — SIGNIFICANT CHANGE UP
BUN SERPL-MCNC: 35.5 MG/DL — HIGH (ref 8–20)
CALCIUM SERPL-MCNC: 9.7 MG/DL — SIGNIFICANT CHANGE UP (ref 8.4–10.5)
CHLORIDE SERPL-SCNC: 100 MMOL/L — SIGNIFICANT CHANGE UP (ref 98–107)
CO2 SERPL-SCNC: 24 MMOL/L — SIGNIFICANT CHANGE UP (ref 22–29)
COLOR SPEC: YELLOW — SIGNIFICANT CHANGE UP
CREAT SERPL-MCNC: 1.78 MG/DL — HIGH (ref 0.5–1.3)
DIFF PNL FLD: NEGATIVE — SIGNIFICANT CHANGE UP
EGFR: 28 ML/MIN/1.73M2 — LOW
EOSINOPHIL # BLD AUTO: 0.08 K/UL — SIGNIFICANT CHANGE UP (ref 0–0.5)
EOSINOPHIL NFR BLD AUTO: 1 % — SIGNIFICANT CHANGE UP (ref 0–6)
EPI CELLS # UR: SIGNIFICANT CHANGE UP
GLUCOSE SERPL-MCNC: 111 MG/DL — HIGH (ref 70–99)
GLUCOSE UR QL: NEGATIVE MG/DL — SIGNIFICANT CHANGE UP
HCT VFR BLD CALC: 30.8 % — LOW (ref 34.5–45)
HGB BLD-MCNC: 10.2 G/DL — LOW (ref 11.5–15.5)
IMM GRANULOCYTES NFR BLD AUTO: 0.4 % — SIGNIFICANT CHANGE UP (ref 0–1.5)
KETONES UR-MCNC: NEGATIVE — SIGNIFICANT CHANGE UP
LACTATE BLDV-MCNC: 0.9 MMOL/L — SIGNIFICANT CHANGE UP (ref 0.5–2)
LEUKOCYTE ESTERASE UR-ACNC: ABNORMAL
LYMPHOCYTES # BLD AUTO: 1.04 K/UL — SIGNIFICANT CHANGE UP (ref 1–3.3)
LYMPHOCYTES # BLD AUTO: 13.1 % — SIGNIFICANT CHANGE UP (ref 13–44)
MCHC RBC-ENTMCNC: 29.1 PG — SIGNIFICANT CHANGE UP (ref 27–34)
MCHC RBC-ENTMCNC: 33.1 GM/DL — SIGNIFICANT CHANGE UP (ref 32–36)
MCV RBC AUTO: 88 FL — SIGNIFICANT CHANGE UP (ref 80–100)
MONOCYTES # BLD AUTO: 0.78 K/UL — SIGNIFICANT CHANGE UP (ref 0–0.9)
MONOCYTES NFR BLD AUTO: 9.8 % — SIGNIFICANT CHANGE UP (ref 2–14)
NEUTROPHILS # BLD AUTO: 5.99 K/UL — SIGNIFICANT CHANGE UP (ref 1.8–7.4)
NEUTROPHILS NFR BLD AUTO: 75.3 % — SIGNIFICANT CHANGE UP (ref 43–77)
NITRITE UR-MCNC: NEGATIVE — SIGNIFICANT CHANGE UP
PH UR: 7 — SIGNIFICANT CHANGE UP (ref 5–8)
PLATELET # BLD AUTO: 158 K/UL — SIGNIFICANT CHANGE UP (ref 150–400)
POTASSIUM SERPL-MCNC: 4.4 MMOL/L — SIGNIFICANT CHANGE UP (ref 3.5–5.3)
POTASSIUM SERPL-SCNC: 4.4 MMOL/L — SIGNIFICANT CHANGE UP (ref 3.5–5.3)
PROT SERPL-MCNC: 6.3 G/DL — LOW (ref 6.6–8.7)
PROT UR-MCNC: 15 MG/DL
RBC # BLD: 3.5 M/UL — LOW (ref 3.8–5.2)
RBC # FLD: 13.4 % — SIGNIFICANT CHANGE UP (ref 10.3–14.5)
RBC CASTS # UR COMP ASSIST: SIGNIFICANT CHANGE UP /HPF (ref 0–4)
SODIUM SERPL-SCNC: 134 MMOL/L — LOW (ref 135–145)
SP GR SPEC: 1.01 — SIGNIFICANT CHANGE UP (ref 1.01–1.02)
UROBILINOGEN FLD QL: NEGATIVE MG/DL — SIGNIFICANT CHANGE UP
WBC # BLD: 7.95 K/UL — SIGNIFICANT CHANGE UP (ref 3.8–10.5)
WBC # FLD AUTO: 7.95 K/UL — SIGNIFICANT CHANGE UP (ref 3.8–10.5)
WBC UR QL: SIGNIFICANT CHANGE UP /HPF (ref 0–5)

## 2022-08-01 PROCEDURE — 74176 CT ABD & PELVIS W/O CONTRAST: CPT | Mod: 26,MG

## 2022-08-01 PROCEDURE — 36415 COLL VENOUS BLD VENIPUNCTURE: CPT

## 2022-08-01 PROCEDURE — G1004: CPT

## 2022-08-01 PROCEDURE — 85025 COMPLETE CBC W/AUTO DIFF WBC: CPT

## 2022-08-01 PROCEDURE — 81001 URINALYSIS AUTO W/SCOPE: CPT

## 2022-08-01 PROCEDURE — 80053 COMPREHEN METABOLIC PANEL: CPT

## 2022-08-01 PROCEDURE — 71046 X-RAY EXAM CHEST 2 VIEWS: CPT

## 2022-08-01 PROCEDURE — 93010 ELECTROCARDIOGRAM REPORT: CPT

## 2022-08-01 PROCEDURE — 96374 THER/PROPH/DIAG INJ IV PUSH: CPT

## 2022-08-01 PROCEDURE — 99285 EMERGENCY DEPT VISIT HI MDM: CPT

## 2022-08-01 PROCEDURE — 74176 CT ABD & PELVIS W/O CONTRAST: CPT | Mod: MG

## 2022-08-01 PROCEDURE — 83605 ASSAY OF LACTIC ACID: CPT

## 2022-08-01 PROCEDURE — 71046 X-RAY EXAM CHEST 2 VIEWS: CPT | Mod: 26

## 2022-08-01 PROCEDURE — 93005 ELECTROCARDIOGRAM TRACING: CPT

## 2022-08-01 PROCEDURE — 96375 TX/PRO/DX INJ NEW DRUG ADDON: CPT

## 2022-08-01 PROCEDURE — 99285 EMERGENCY DEPT VISIT HI MDM: CPT | Mod: 25

## 2022-08-01 RX ORDER — IOHEXOL 300 MG/ML
30 INJECTION, SOLUTION INTRAVENOUS ONCE
Refills: 0 | Status: DISCONTINUED | OUTPATIENT
Start: 2022-08-01 | End: 2022-08-01

## 2022-08-01 RX ORDER — SODIUM CHLORIDE 9 MG/ML
1000 INJECTION, SOLUTION INTRAVENOUS ONCE
Refills: 0 | Status: COMPLETED | OUTPATIENT
Start: 2022-08-01 | End: 2022-08-01

## 2022-08-01 RX ORDER — AMPICILLIN SODIUM AND SULBACTAM SODIUM 250; 125 MG/ML; MG/ML
1.5 INJECTION, POWDER, FOR SUSPENSION INTRAMUSCULAR; INTRAVENOUS ONCE
Refills: 0 | Status: COMPLETED | OUTPATIENT
Start: 2022-08-01 | End: 2022-08-01

## 2022-08-01 RX ORDER — DIATRIZOATE MEGLUMINE 180 MG/ML
30 INJECTION, SOLUTION INTRAVESICAL ONCE
Refills: 0 | Status: COMPLETED | OUTPATIENT
Start: 2022-08-01 | End: 2022-08-01

## 2022-08-01 RX ORDER — ONDANSETRON 8 MG/1
4 TABLET, FILM COATED ORAL ONCE
Refills: 0 | Status: COMPLETED | OUTPATIENT
Start: 2022-08-01 | End: 2022-08-01

## 2022-08-01 RX ORDER — ACETAMINOPHEN 500 MG
650 TABLET ORAL ONCE
Refills: 0 | Status: COMPLETED | OUTPATIENT
Start: 2022-08-01 | End: 2022-08-01

## 2022-08-01 RX ADMIN — DIATRIZOATE MEGLUMINE 30 MILLILITER(S): 180 INJECTION, SOLUTION INTRAVESICAL at 12:04

## 2022-08-01 RX ADMIN — SODIUM CHLORIDE 250 MILLILITER(S): 9 INJECTION, SOLUTION INTRAVENOUS at 12:04

## 2022-08-01 RX ADMIN — ONDANSETRON 4 MILLIGRAM(S): 8 TABLET, FILM COATED ORAL at 11:47

## 2022-08-01 RX ADMIN — AMPICILLIN SODIUM AND SULBACTAM SODIUM 200 GRAM(S): 250; 125 INJECTION, POWDER, FOR SUSPENSION INTRAMUSCULAR; INTRAVENOUS at 17:18

## 2022-08-01 RX ADMIN — Medication 650 MILLIGRAM(S): at 11:47

## 2022-08-01 NOTE — ED PROVIDER NOTE - ATTENDING CONTRIBUTION TO CARE
82F PMHx HTN, HLD, diverticulitis s/p resection in 1997, R renal cancer s/p nephrectomy in 3/2022 presents LLQ abdominal pain beginning yesterday AM.pe awake alert in nad heent ncat neck supple cor s1s2 lung clear abd soft tender llq  neuro nonfocal  dx abdominal pain eval for diverticultis

## 2022-08-01 NOTE — ED PROVIDER NOTE - NSICDXPASTMEDICALHX_GEN_ALL_CORE_FT
PAST MEDICAL HISTORY:  High cholesterol     HTN (hypertension)     Nephrolithiasis     Renal cell carcinoma right    Sleep apnea cpap at home uses at home

## 2022-08-01 NOTE — ED PROVIDER NOTE - CLINICAL SUMMARY MEDICAL DECISION MAKING FREE TEXT BOX
82F hx multiple abdominal surgeries including sigmoid colon resection s/p diverticulitis over 2 decades ago presents for acute onset LLQ abdominal pain with assoc chills and nausea. No vomiting or changes in bowel movements. R/o SBO, recurrent diverticulitis.

## 2022-08-01 NOTE — ED PROVIDER NOTE - NS ED ROS FT
General: Chills. Denies fever  HEENT: Denies sensory changes, sore throat  Neck: Denies neck pain, neck stiffness  Resp: Denies coughing, SOB  Cardiovascular: Denies CP, palpitations, LE edema  GI: Abd pain, nausea. Denies vomiting, diarrhea, constipation, blood in stool  : Denies dysuria, hematuria, frequency, incontinence  MSK: Denies back pain  Neuro: Denies HA, dizziness, numbness, weakness  Skin: Denies rashes.

## 2022-08-01 NOTE — ED ADULT TRIAGE NOTE - CHIEF COMPLAINT QUOTE
Pt BIBA from doctors office, c/o LLQ abdominal pain since last night, hx of diverticulitis w/ resection in 1997

## 2022-08-01 NOTE — ED PROVIDER NOTE - PHYSICAL EXAMINATION
General: Well appearing, lying in bed in mild distress 2/2 pain  HEENT: Normocephalic, atraumatic. No scleral icterus. EOMI. Moist mucous membranes. Oropharynx clear.   Neck:. Soft and supple. Full ROM without pain. No midline tenderness. No lymphadenopathy.  Cardiac: RRR, +S1/S2. No murmurs, rubs, gallops. Peripheral pulses 2+ and symmetric. No LE edema.  Resp: Lungs CTAB. Speaking in full sentences. No wheezes, rales or rhonchi.  Abd: LLQ tenderness to palpation. Soft, no guarding or rebound. No masses, or lesions.  Back: Spine midline and non-tender. No CVA tenderness.    Skin: No rashes, abrasions, or lacerations.  Neuro: AO x 3. Moves all extremities symmetrically. Motor strength and sensation grossly intact.

## 2022-08-01 NOTE — ED ADULT NURSE REASSESSMENT NOTE - NS ED NURSE REASSESS COMMENT FT1
Pt is resting in bed comfortably at this time, no apparent distress noted at this time. pt safety maintained. Pt denies any complaints at this time. pt awaiting ct scan. pt ambulated to bathroom independently without incident. daughter remains at bedside.pt updated on plan of care.

## 2022-08-01 NOTE — ED PROVIDER NOTE - NSICDXPASTSURGICALHX_GEN_ALL_CORE_FT
PAST SURGICAL HISTORY:  History of appendectomy     History of bowel resection     History of cholecystectomy     S/P colon resection "a foot" of colon for diverticulitis    S/P vaginal hysterectomy

## 2022-08-01 NOTE — ED ADULT NURSE NOTE - OBJECTIVE STATEMENT
pt care assumed at 1140, no apparent distress noted at this time. pt received Alert and Oriented to person, place, situation and time sitting in bed comfortably with family member at bedside. pt c/o LLQ intermittent pain that started yesterday morning at which point, p thought was gas. pt states after she ate dinner, pain intensified and became constant. pt c/o nausea with no vomiting and more frequent bowel movements then pt baseline. pt states she has h/o diverticulitis with colon resection. pt states she took abx for uti but stopped because her "doctor told her the urine cultures were negative."

## 2022-08-01 NOTE — ED PROVIDER NOTE - OBJECTIVE STATEMENT
82F PMHx HTN, HLD, multiple abdominal surgeries including diverticulitis s/p resection in 1997, R renal cancer s/p nephrectomy in 3/2022 presents with a day of LLQ abdominal pain beginning yesterday AM. Pt reports sharp pain 8-9/10 with associated nausea and chills last night. No vomiting, diarrhea, constipation, blood in stool, fever, CP, SOB. Notes recently was empirically treated for UTI but UCx returned normal therefore discontinued her abx.

## 2022-08-01 NOTE — ED PROVIDER NOTE - PATIENT PORTAL LINK FT
You can access the FollowMyHealth Patient Portal offered by [facility] by registering at the following website: [email or web address]. By joining [employer]’s FollowMyHealth portal, you will also be able to view your health information using other applications (apps) compatible with our system.

## 2022-09-09 ENCOUNTER — EMERGENCY (EMERGENCY)
Facility: HOSPITAL | Age: 82
LOS: 1 days | Discharge: HOME | End: 2022-09-09
Attending: EMERGENCY MEDICINE
Payer: MEDICARE

## 2022-09-09 VITALS
DIASTOLIC BLOOD PRESSURE: 62 MMHG | RESPIRATION RATE: 19 BRPM | OXYGEN SATURATION: 100 % | WEIGHT: 145.06 LBS | HEART RATE: 60 BPM | TEMPERATURE: 98 F | HEIGHT: 63 IN | SYSTOLIC BLOOD PRESSURE: 138 MMHG

## 2022-09-09 VITALS
SYSTOLIC BLOOD PRESSURE: 115 MMHG | HEART RATE: 66 BPM | DIASTOLIC BLOOD PRESSURE: 76 MMHG | OXYGEN SATURATION: 98 % | RESPIRATION RATE: 18 BRPM | TEMPERATURE: 98 F

## 2022-09-09 DIAGNOSIS — Z90.49 ACQUIRED ABSENCE OF OTHER SPECIFIED PARTS OF DIGESTIVE TRACT: Chronic | ICD-10-CM

## 2022-09-09 DIAGNOSIS — Z90.710 ACQUIRED ABSENCE OF BOTH CERVIX AND UTERUS: Chronic | ICD-10-CM

## 2022-09-09 DIAGNOSIS — Z92.89 PERSONAL HISTORY OF OTHER MEDICAL TREATMENT: Chronic | ICD-10-CM

## 2022-09-09 PROBLEM — C64.9 MALIGNANT NEOPLASM OF UNSPECIFIED KIDNEY, EXCEPT RENAL PELVIS: Chronic | Status: ACTIVE | Noted: 2022-08-01

## 2022-09-09 LAB
ALBUMIN SERPL ELPH-MCNC: 3.9 G/DL — SIGNIFICANT CHANGE UP (ref 3.3–5.2)
ALP SERPL-CCNC: 93 U/L — SIGNIFICANT CHANGE UP (ref 40–120)
ALT FLD-CCNC: 18 U/L — SIGNIFICANT CHANGE UP
ANION GAP SERPL CALC-SCNC: 10 MMOL/L — SIGNIFICANT CHANGE UP (ref 5–17)
APPEARANCE UR: CLEAR — SIGNIFICANT CHANGE UP
AST SERPL-CCNC: 20 U/L — SIGNIFICANT CHANGE UP
BACTERIA # UR AUTO: ABNORMAL
BASE EXCESS BLDV CALC-SCNC: 2.3 MMOL/L — SIGNIFICANT CHANGE UP (ref -2–3)
BASOPHILS # BLD AUTO: 0.03 K/UL — SIGNIFICANT CHANGE UP (ref 0–0.2)
BASOPHILS NFR BLD AUTO: 0.4 % — SIGNIFICANT CHANGE UP (ref 0–2)
BILIRUB SERPL-MCNC: 0.3 MG/DL — LOW (ref 0.4–2)
BILIRUB UR-MCNC: NEGATIVE — SIGNIFICANT CHANGE UP
BUN SERPL-MCNC: 36.4 MG/DL — HIGH (ref 8–20)
CA-I SERPL-SCNC: 1.21 MMOL/L — SIGNIFICANT CHANGE UP (ref 1.15–1.33)
CALCIUM SERPL-MCNC: 9 MG/DL — SIGNIFICANT CHANGE UP (ref 8.4–10.5)
CHLORIDE BLDV-SCNC: 99 MMOL/L — SIGNIFICANT CHANGE UP (ref 98–107)
CHLORIDE SERPL-SCNC: 97 MMOL/L — LOW (ref 98–107)
CO2 SERPL-SCNC: 23 MMOL/L — SIGNIFICANT CHANGE UP (ref 22–29)
COLOR SPEC: YELLOW — SIGNIFICANT CHANGE UP
CREAT SERPL-MCNC: 1.78 MG/DL — HIGH (ref 0.5–1.3)
DIFF PNL FLD: NEGATIVE — SIGNIFICANT CHANGE UP
EGFR: 28 ML/MIN/1.73M2 — LOW
EOSINOPHIL # BLD AUTO: 0.09 K/UL — SIGNIFICANT CHANGE UP (ref 0–0.5)
EOSINOPHIL NFR BLD AUTO: 1.2 % — SIGNIFICANT CHANGE UP (ref 0–6)
EPI CELLS # UR: ABNORMAL
GAS PNL BLDV: 129 MMOL/L — LOW (ref 136–145)
GAS PNL BLDV: SIGNIFICANT CHANGE UP
GLUCOSE BLDV-MCNC: 142 MG/DL — HIGH (ref 70–99)
GLUCOSE SERPL-MCNC: 132 MG/DL — HIGH (ref 70–99)
GLUCOSE UR QL: NEGATIVE MG/DL — SIGNIFICANT CHANGE UP
HCO3 BLDV-SCNC: 28 MMOL/L — SIGNIFICANT CHANGE UP (ref 22–29)
HCT VFR BLD CALC: 29.8 % — LOW (ref 34.5–45)
HCT VFR BLDA CALC: 31 % — SIGNIFICANT CHANGE UP
HGB BLD CALC-MCNC: 10.2 G/DL — LOW (ref 11.7–16.1)
HGB BLD-MCNC: 9.8 G/DL — LOW (ref 11.5–15.5)
IMM GRANULOCYTES NFR BLD AUTO: 0.3 % — SIGNIFICANT CHANGE UP (ref 0–1.5)
KETONES UR-MCNC: NEGATIVE — SIGNIFICANT CHANGE UP
LACTATE BLDV-MCNC: 0.7 MMOL/L — SIGNIFICANT CHANGE UP (ref 0.5–2)
LEUKOCYTE ESTERASE UR-ACNC: ABNORMAL
LYMPHOCYTES # BLD AUTO: 1.01 K/UL — SIGNIFICANT CHANGE UP (ref 1–3.3)
LYMPHOCYTES # BLD AUTO: 13.5 % — SIGNIFICANT CHANGE UP (ref 13–44)
MAGNESIUM SERPL-MCNC: 2.3 MG/DL — SIGNIFICANT CHANGE UP (ref 1.6–2.6)
MCHC RBC-ENTMCNC: 29 PG — SIGNIFICANT CHANGE UP (ref 27–34)
MCHC RBC-ENTMCNC: 32.9 GM/DL — SIGNIFICANT CHANGE UP (ref 32–36)
MCV RBC AUTO: 88.2 FL — SIGNIFICANT CHANGE UP (ref 80–100)
MONOCYTES # BLD AUTO: 0.55 K/UL — SIGNIFICANT CHANGE UP (ref 0–0.9)
MONOCYTES NFR BLD AUTO: 7.4 % — SIGNIFICANT CHANGE UP (ref 2–14)
NEUTROPHILS # BLD AUTO: 5.77 K/UL — SIGNIFICANT CHANGE UP (ref 1.8–7.4)
NEUTROPHILS NFR BLD AUTO: 77.2 % — HIGH (ref 43–77)
NITRITE UR-MCNC: NEGATIVE — SIGNIFICANT CHANGE UP
PCO2 BLDV: 49 MMHG — HIGH (ref 39–42)
PH BLDV: 7.36 — SIGNIFICANT CHANGE UP (ref 7.32–7.43)
PH UR: 7 — SIGNIFICANT CHANGE UP (ref 5–8)
PLATELET # BLD AUTO: 167 K/UL — SIGNIFICANT CHANGE UP (ref 150–400)
PO2 BLDV: 89 MMHG — HIGH (ref 25–45)
POTASSIUM BLDV-SCNC: 4.9 MMOL/L — SIGNIFICANT CHANGE UP (ref 3.5–5.1)
POTASSIUM SERPL-MCNC: 4.7 MMOL/L — SIGNIFICANT CHANGE UP (ref 3.5–5.3)
POTASSIUM SERPL-SCNC: 4.7 MMOL/L — SIGNIFICANT CHANGE UP (ref 3.5–5.3)
PROT SERPL-MCNC: 6.1 G/DL — LOW (ref 6.6–8.7)
PROT UR-MCNC: 15
RBC # BLD: 3.38 M/UL — LOW (ref 3.8–5.2)
RBC # FLD: 13.3 % — SIGNIFICANT CHANGE UP (ref 10.3–14.5)
RBC CASTS # UR COMP ASSIST: SIGNIFICANT CHANGE UP /HPF (ref 0–4)
SAO2 % BLDV: 98.2 % — SIGNIFICANT CHANGE UP
SARS-COV-2 RNA SPEC QL NAA+PROBE: SIGNIFICANT CHANGE UP
SODIUM SERPL-SCNC: 130 MMOL/L — LOW (ref 135–145)
SP GR SPEC: 1.01 — SIGNIFICANT CHANGE UP (ref 1.01–1.02)
TROPONIN T SERPL-MCNC: <0.01 NG/ML — SIGNIFICANT CHANGE UP (ref 0–0.06)
UROBILINOGEN FLD QL: NEGATIVE MG/DL — SIGNIFICANT CHANGE UP
WBC # BLD: 7.47 K/UL — SIGNIFICANT CHANGE UP (ref 3.8–10.5)
WBC # FLD AUTO: 7.47 K/UL — SIGNIFICANT CHANGE UP (ref 3.8–10.5)
WBC UR QL: ABNORMAL /HPF (ref 0–5)

## 2022-09-09 PROCEDURE — 36415 COLL VENOUS BLD VENIPUNCTURE: CPT

## 2022-09-09 PROCEDURE — U0005: CPT

## 2022-09-09 PROCEDURE — 71045 X-RAY EXAM CHEST 1 VIEW: CPT | Mod: 26

## 2022-09-09 PROCEDURE — 82947 ASSAY GLUCOSE BLOOD QUANT: CPT

## 2022-09-09 PROCEDURE — 87086 URINE CULTURE/COLONY COUNT: CPT

## 2022-09-09 PROCEDURE — 81001 URINALYSIS AUTO W/SCOPE: CPT

## 2022-09-09 PROCEDURE — U0003: CPT

## 2022-09-09 PROCEDURE — 99285 EMERGENCY DEPT VISIT HI MDM: CPT | Mod: 25

## 2022-09-09 PROCEDURE — 82435 ASSAY OF BLOOD CHLORIDE: CPT

## 2022-09-09 PROCEDURE — 85018 HEMOGLOBIN: CPT

## 2022-09-09 PROCEDURE — 93010 ELECTROCARDIOGRAM REPORT: CPT

## 2022-09-09 PROCEDURE — 80053 COMPREHEN METABOLIC PANEL: CPT

## 2022-09-09 PROCEDURE — 82330 ASSAY OF CALCIUM: CPT

## 2022-09-09 PROCEDURE — 84295 ASSAY OF SERUM SODIUM: CPT

## 2022-09-09 PROCEDURE — 82803 BLOOD GASES ANY COMBINATION: CPT

## 2022-09-09 PROCEDURE — 70450 CT HEAD/BRAIN W/O DYE: CPT | Mod: 26,MA

## 2022-09-09 PROCEDURE — 84484 ASSAY OF TROPONIN QUANT: CPT

## 2022-09-09 PROCEDURE — 85025 COMPLETE CBC W/AUTO DIFF WBC: CPT

## 2022-09-09 PROCEDURE — 99285 EMERGENCY DEPT VISIT HI MDM: CPT

## 2022-09-09 PROCEDURE — 83735 ASSAY OF MAGNESIUM: CPT

## 2022-09-09 PROCEDURE — 93005 ELECTROCARDIOGRAM TRACING: CPT

## 2022-09-09 PROCEDURE — 83605 ASSAY OF LACTIC ACID: CPT

## 2022-09-09 PROCEDURE — 82962 GLUCOSE BLOOD TEST: CPT

## 2022-09-09 PROCEDURE — 85014 HEMATOCRIT: CPT

## 2022-09-09 PROCEDURE — 84132 ASSAY OF SERUM POTASSIUM: CPT

## 2022-09-09 PROCEDURE — 71045 X-RAY EXAM CHEST 1 VIEW: CPT

## 2022-09-09 PROCEDURE — 70450 CT HEAD/BRAIN W/O DYE: CPT | Mod: MA

## 2022-09-09 NOTE — ED PROVIDER NOTE - CARE PROVIDER_API CALL
Jennifer Zuniga)  Internal Medicine  [address] [place] [id number]  Phone: [phone]  Fax: [phone]  Established Patient  Follow Up Time: Urgent

## 2022-09-09 NOTE — ED PROVIDER NOTE - ATTENDING CONTRIBUTION TO CARE
I personally saw the patient with the resident, and completed the key components of the history and physical exam. I then discussed the management plan with the resident.    81 y/o F with PMh HTN, HLD, renal cell carcinoma s/p nephrectomy in March 2022 presents for syncopal episode with prodrome of diaphoresis and nausea x 2 while in [employer]. She states it comes in waves, then resolves. She currently feels well. No history of prior events. Denies chest pain, SOB, dizziness, pain of any kind. She had diarrhea last week which has since resolved. Denies fevers, chills.    PE - NAD, well appearing, RRR, lungs CTA B/L, abd soft NT/ND, 2+ symmetrical distal pulses, calves soft and non-tender.    EKG non-ischemic, patient on monitor without arrhythmia, labs, orthostatics, CXR, CT head, cardiology consult.

## 2022-09-09 NOTE — ED PROVIDER NOTE - NSFOLLOWUPINSTRUCTIONS_ED_ALL_ED_FT
Syncope, Adult    Outline of the head showing blood vessels that supply the brain.   Syncope is when you pass out or faint for a short time. It is caused by a sudden decrease in blood flow to the brain. This can happen for many reasons. It can sometimes happen when seeing blood, getting a shot (injection), or having pain or strong emotions. Most causes of fainting are not dangerous, but in some cases it can be a sign of a serious medical problem.    If you faint, get help right away. Call your local emergency services (911 in the [place]      Follow these instructions at home:    Watch for any changes in your symptoms. Take these actions to stay safe and help with your symptoms:    Knowing when you may be about to faint   •Signs that you may be about to faint include:  •Feeling dizzy or light-headed. It may feel like the room is spinning.      •Feeling weak.      •Feeling like you may vomit (nauseous).      •Seeing spots or seeing all white or all black.      •Having cold, clammy skin.      •Feeling warm and sweaty.      •Hearing ringing in the ears.      •If you start to feel like you might faint, sit or lie down right away. If sitting, lower your head down between your legs. If lying down, raise (elevate) your feet above the level of your heart.  •Breathe deeply and steadily. Wait until all of the symptoms are gone.      •Have someone stay with you until you feel better.        Medicines     •Take over-the-counter and prescription medicines only as told by your doctor.      •If you are taking blood pressure or heart medicine, sit up and stand up slowly. Spend a few minutes getting ready to sit and then stand. This can help you feel less dizzy.      Lifestyle     • Do not drive, use machinery, or play sports until your doctor says it is okay.      • Do not drink alcohol.      • Do not smoke or use any products that contain nicotine or tobacco. If you need help quitting, ask your doctor.      •Avoid hot tubs and saunas.      General instructions     •Talk with your doctor about your symptoms. You may need to have testing to help find the cause.      •Drink enough fluid to keep your pee (urine) pale yellow.    •Avoid standing for a long time. If you must stand for a long time, do movements such as:  •Moving your legs.       •Crossing your legs.       •Flexing and stretching your leg muscles.       •Squatting.        •Keep all follow-up visits.        Contact a doctor if:    •You have episodes of near fainting.        Get help right away if:    •You pass out or faint.      •You hit your head or are injured after fainting.    •You have any of these symptoms:  •Fast or uneven heartbeats (palpitations).      •Pain in your chest, belly, or back.      •Shortness of breath.        •You have jerky movements that you cannot control (seizure).      •You have a very bad headache.      •You are confused.      •You have problems with how you see (vision).      •You are very weak.      •You have trouble walking.      •You are bleeding from your mouth or your butt (rectum).      •You have black or tarry poop (stool).      These symptoms may be an emergency. Get help right away. Call your local emergency services (911 in the [place]    • Do not wait to see if the symptoms will go away.       • Do not drive yourself to the hospital.         Summary    •Syncope is when you pass out or faint for a short time. It is caused by a sudden decrease in blood flow to the brain.      •Signs that you may be about to faint include feeling dizzy or light-headed, feeling like you may vomit, seeing all white or all black, or having cold, clammy skin.      •If you start to feel like you might faint, sit or lie down right away. Lower your head if sitting, or raise (elevate) your feet if lying down. Breathe deeply and steadily. Wait until all of the symptoms are gone.      This information is not intended to replace advice given to you by your health care provider. Make sure you discuss any questions you have with your health care provider.

## 2022-09-09 NOTE — ED ADULT TRIAGE NOTE - NS ED TRIAGE AVPU SCALE
Dr. Thorpe has not seen patient since 2012 at which time he only saw her once and telephone encounter stated, \"she did not feel the need to follow up with Dr. Thorpe further.\" Patient also reports she did not take her Multaq this morning as she \"does not feel good\" on this medication. Writer instructed patient that she should call Dr. Andrew's office to have EKG done. Referral will need to be placed for Dr. Thorpe to see patient.    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 She follows with Dr. Cortes (sp?) at [facility] in [place], [place].       Alert-The patient is alert, awake and responds to voice. The patient is oriented to time, place, and person. The triage nurse is able to obtain subjective information.

## 2022-09-09 NOTE — ED ADULT NURSE REASSESSMENT NOTE - NS ED NURSE REASSESS COMMENT FT1
pt hemodynamically stable, PIV removed, refer to flowsheet and chart, pt to be discharged, pt understood discharge instructions and plan of care. Pt medically cleared for discharge.

## 2022-09-09 NOTE — ED ADULT TRIAGE NOTE - CHIEF COMPLAINT QUOTE
Pt BIBA from [facility] s/p syncopal episode at [facility]. Pt states she was walking back from receiving communion and felt lightheaded prior to syncope and began to sweat.  Pt fell to ground witness; negative head trauma; negative blood thinners.  PMH HTN.  Pt found to be hypotensive for EMS 80/40; admin 1L NS and 4mg zofran en route to ED

## 2022-09-09 NOTE — ED ADULT NURSE NOTE - CHIEF COMPLAINT QUOTE
Pt BIBA from [facility] s/p syncopal episode at [facility]. Pt states she was walking back from receiving communion and felt lightheaded prior to syncope and began to sweat.  Pt fell to ground witness; negative head trauma; negative blood thinners.  PMH HTN.  Pt found to be hypotensive for EMS 80/40; admin 1L NS and 4mg zofran en route to ED

## 2022-09-09 NOTE — ED PROVIDER NOTE - PROGRESS NOTE DETAILS
Za: Cardiology consult called. Za: Cardiology evaluated patient, patient feels well, comfortable with discharge.

## 2022-09-09 NOTE — CONSULT NOTE ADULT - SUBJECTIVE AND OBJECTIVE BOX
[facility], [facility]                                   [address] [place] [address]                                                      PHONE: [phone]                                                         FAX: [phone]  [email or web address]  ---------------------------------------------------------------------------------------------------------------------------------    Reason for Consult: syncope    HPI:  ALICIA NAIDU is an 82y Female with HTN, HLD, nonobstructive CAD (cath ), renal cell carcinoma s/p nephrectomy in 2022 presented today with syncope. She was at [employer] this morning and fainted twice. She felt hot and dizzy the first time and lost consciousness. She was escorted back to her seat where she says she fainted again. She denies chest pain or SOB or palpitations prior to the episodes. She was sick last week with a stomach bug and had diarrhea, but this week has felt well and says her appetite has been good.     PAST MEDICAL & SURGICAL HISTORY:  Sleep apnea  cpap at home uses at home      HTN (hypertension)      High cholesterol      Nephrolithiasis      Renal cell carcinoma  right      History of appendectomy      History of cholecystectomy      History of bowel resection      S/P vaginal hysterectomy      S/P colon resection  &quot;a foot&quot; of colon for diverticulitis          azithromycin (Hives; Rash)  Exforge (Hives; Rash)  statins (Short breath)      MEDICATIONS  (STANDING):    MEDICATIONS  (PRN):      Social History:  Cigarettes: none    Family History:  denies CAD, MI, CVA, or sudden death.    ROS: Negative other than as mentioned in HPI.    Vital Signs Last 24 Hrs  T(C): 36.5 (09 Sep 2022 14:06), Max: 36.5 (09 Sep 2022 14:06)  T(F): 97.7 (09 Sep 2022 14:06), Max: 97.7 (09 Sep 2022 14:06)  HR: 68 (09 Sep 2022 14:06) (60 - 68)  BP: 197/82 (09 Sep 2022 14:07) (138/62 - 202/79)  BP(mean): --  RR: 20 (09 Sep 2022 14:06) (19 - 20)  SpO2: 100% (09 Sep 2022 14:06) (100% - 100%)    Parameters below as of 09 Sep 2022 14:06  Patient On (Oxygen Delivery Method): room air      ICU Vital Signs Last 24 Hrs  ALICIA NAIDU  I&O's Detail    I&O's Summary    Drug Dosing Weight  ALICIA NAIDU      PHYSICAL EXAM:  General: NAD  HEENT: Head; normocephalic, atraumatic.  Eyes: Pupils reactive, cornea wnl.  Neck: Supple, no nodes adenopathy, no NVD or carotid bruit or thyromegaly.  CARDIOVASCULAR: Normal S1 and S2, No murmur, rub, gallop or lift.   LUNGS: No rales, rhonchi or wheeze. Normal breath sounds bilaterally.  ABDOMEN: Soft, nontender without mass or organomegaly. bowel sounds normoactive.  EXTREMITIES: No clubbing, cyanosis or edema. Distal pulses wnl.   SKIN: warm and dry with normal turgor.  NEURO: Alert/oriented x 3  PSYCH: normal affect.        LABS:                        9.8    7.47  )-----------( 167      ( 09 Sep 2022 11:57 )             29.8         130<L>  |  97<L>  |  36.4<H>  ----------------------------<  132<H>  4.7   |  23.0  |  1.78<H>    Ca    9.0      09 Sep 2022 11:57  Mg     2.3     -    TPro  6.1<L>  /  Alb  3.9  /  TBili  0.3<L>  /  DBili  x   /  AST  20  /  ALT  18  /  AlkPhos  93  09-    ALICIA NAIDU  CARDIAC MARKERS ( 09 Sep 2022 11:57 )  x     / <0.01 ng/mL / x     / x     / x            Urinalysis Basic - ( 09 Sep 2022 12:28 )    Color: Yellow / Appearance: Clear / S.010 / pH: x  Gluc: x / Ketone: Negative  / Bili: Negative / Urobili: Negative mg/dL   Blood: x / Protein: 15 / Nitrite: Negative   Leuk Esterase: Small / RBC: 0-2 /HPF / WBC 6-10 /HPF   Sq Epi: x / Non Sq Epi: Moderate / Bacteria: Occasional        RADIOLOGY & ADDITIONAL STUDIES:    INTERPRETATION OF TELEMETRY (personally reviewed): sinus rhythm     ECG: normal sinus rhythm    ECHO 21  LVEF 55-60%.  Trace MR.  No aortic stenosis.    CAROTID DOPPLER 21  No evidence of stenosis b/l ICA.     CARDIAC CATHETERIZATION 2017  Nonobstructive CAD.    Assessment and Plan:  In summary, ALICIA NAIDU is an 82y Female with past medical history significant for HTN, HLD, nonobstructive CAD (cath 2017), renal cell carcinoma s/p nephrectomy in 2022 presented today with syncope. She was at [employer] this morning and fainted twice. She felt hot and dizzy the first time and lost consciousness. She was escorted back to her seat where she says she fainted again. She denies chest pain or SOB or palpitations prior to the episodes. She was sick last week with a stomach bug and had diarrhea, but this week has felt well and says her appetite has been good.     - EKG and telemetry reviewed and show normal sinus rhythm. Bedside echo shows normal LVEF without effusion. Official echo and carotid doppler in Dec 2021 unremarkable.   - likely vasovagal syncope possibly secondary to recent viral gastroenteritis and diarrhea last week  - PO hydration encouraged  - will need close outpatient follow up for hyponatremia and repeat labs    Stable for discharge home today from a cardiac perspective. I will arrange close outpatient follow up with Dr. Zuniga.

## 2022-09-09 NOTE — ED PROCEDURE NOTE - US DIAGNOSIS
Grossly Normal Function/Cardiac Standstill/No Effusion Spine appears normal, curvature to spine midline and diffuse paraspinal c-s spine ttp, no redness no swelling no swelling to shoulder joints, limited rom of R shoulder due to pain, arthritis changes to hands.

## 2022-09-09 NOTE — ED PROVIDER NOTE - PATIENT PORTAL LINK FT
You can access the FollowMyHealth Patient Portal offered by [facility] by registering at the following website: [email or web address]. By joining [employer]’s FollowMyHealth portal, you will also be able to view your health information using other applications (apps) compatible with our system.

## 2022-09-09 NOTE — ED ADULT NURSE NOTE - OBJECTIVE STATEMENT
Pt with PMHx of HTN, HLD, Renal ca s/p R nephrectomy 3/22 presents with nausea, syncopal episode at [employer] today. Pt became lightheaded and diaphoretic right before syncopal episode. Pt endorses urinary incontinence during episode. Pt slumped over in chair, did not fall or hit head. Pt denies any CP, abd pain, vomiting, fevers, chills, cough. Follows cardiology MD Keys.

## 2022-09-11 LAB
CULTURE RESULTS: SIGNIFICANT CHANGE UP
SPECIMEN SOURCE: SIGNIFICANT CHANGE UP

## 2022-10-04 ENCOUNTER — RESULT CHARGE (OUTPATIENT)
Age: 82
End: 2022-10-04

## 2022-10-04 ENCOUNTER — APPOINTMENT (OUTPATIENT)
Age: 82
End: 2022-10-04

## 2022-10-04 VITALS — DIASTOLIC BLOOD PRESSURE: 65 MMHG | SYSTOLIC BLOOD PRESSURE: 164 MMHG

## 2022-10-04 DIAGNOSIS — R32 UNSPECIFIED URINARY INCONTINENCE: ICD-10-CM

## 2022-10-04 DIAGNOSIS — Z85.528 PERSONAL HISTORY OF OTHER MALIGNANT NEOPLASM OF KIDNEY: ICD-10-CM

## 2022-10-04 DIAGNOSIS — R35.1 NOCTURIA: ICD-10-CM

## 2022-10-04 DIAGNOSIS — N81.9 FEMALE GENITAL PROLAPSE, UNSPECIFIED: ICD-10-CM

## 2022-10-04 DIAGNOSIS — N39.0 URINARY TRACT INFECTION, SITE NOT SPECIFIED: ICD-10-CM

## 2022-10-04 DIAGNOSIS — Z86.018 PERSONAL HISTORY OF OTHER BENIGN NEOPLASM: ICD-10-CM

## 2022-10-04 LAB
BILIRUB UR QL STRIP: NEGATIVE
CLARITY UR: CLEAR
COLLECTION METHOD: NORMAL
GLUCOSE UR-MCNC: NEGATIVE
HCG UR QL: 0.2 EU/DL
HGB UR QL STRIP.AUTO: NEGATIVE
KETONES UR-MCNC: NEGATIVE
LEUKOCYTE ESTERASE UR QL STRIP: NORMAL
NITRITE UR QL STRIP: NEGATIVE
PH UR STRIP: 7
PROT UR STRIP-MCNC: NEGATIVE
SP GR UR STRIP: 1.01

## 2022-10-04 PROCEDURE — 99204 OFFICE O/P NEW MOD 45 MIN: CPT | Mod: 25

## 2022-10-04 PROCEDURE — 81003 URINALYSIS AUTO W/O SCOPE: CPT | Mod: QW

## 2022-10-04 PROCEDURE — 51701 INSERT BLADDER CATHETER: CPT

## 2022-10-04 NOTE — PHYSICAL EXAM
[Chaperone Present] : A chaperone was present in the examining room during all aspects of the physical examination [No Acute Distress] : in no acute distress [Well developed] : well developed [Well Nourished] : ~L well nourished [Oriented x3] : oriented to person, place, and time [Normal Memory] : ~T memory was ~L unimpaired [Normal Mood/Affect] : mood and affect are normal [No Edema] : ~T edema was not present [None] : no CVA tenderness [Warm and Dry] : was warm and dry to touch [Normal Gait] : gait was normal [Labia Majora] : were normal [Labia Minora] : were normal [Normal Appearance] : general appearance was normal [No Bleeding] : there was no active vaginal bleeding [2] : 2 [Aa ____] : Aa [unfilled] [Ba ____] : Ba [unfilled] [C ____] : C [unfilled] [GH ____] : GH [unfilled] [PB ____] : PB [unfilled] [TVL ____] : TVL  [unfilled] [Ap ____] : Ap [unfilled] [Bp ____] : Bp [unfilled] [D ____] : D [unfilled] [Absent] : absent [Normal] : no abnormalities [Cough] : no cough [Tenderness] : ~T no ~M abdominal tenderness observed [Distended] : not distended [Inguinal LAD] : no adenopathy was noted in the inguinal lymph nodes [FreeTextEntry3] : neg CST, hypermobility  [de-identified] : 250cc 2 hrs after void

## 2022-10-04 NOTE — PROCEDURE
[Straight Catheterization] : insertion of a straight catheter [Urinary Frequency] : urinary frequency [Patient] : the patient [None] : none [___ Fr Straight Tip] : a [unfilled] in [place] straight tip catheter [Clear] : clear [No Complications] : no complications [Tolerated Well] : the patient tolerated the procedure well [Post procedure instructions and information given] : Post procedure instructions and information were given and reviewed with patient. [0] : 0

## 2022-10-04 NOTE — HISTORY OF PRESENT ILLNESS
[Cystocele (Obstetric)] : no [Uterine Prolapse] : no [Vaginal Wall Prolapse] : no [Rectal Prolapse] : no [Unable To Restrain Bowel Movement] : mild [Feelings Of Urinary Urgency] : no [x2] : nocturia two times a night [Pain During Urination (Dysuria)] : no [Urinary Tract Infection] : no [Constipation Obstructed Defecation] : no [] : no [Incomplete Emptying Of Stool] : no [Pelvic Pain] : no [Vaginal Pain] : no [Vulvar Pain] : no [FreeTextEntry1] : \par 82 presents to establish care, she reports that she has UUI, nocturia X 2, denies MAURA, denies frequency, she denies dysuria, denies hematuria, thinks she is emptying, occ intermittent stream, she did have frequent UTI which stopped after nephrectomy, she does report a vaginal bulge that is not very bothersome, she denies pain, she reports she tried a pessary and does not want another one\par \par \par h/o right nephrectomy for renal cancer in 3/3/201\par \par on tamsolosin- to help with stream

## 2022-10-04 NOTE — DISCUSSION/SUMMARY
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  We reviewed management options for her prolapse including: observation, pelvic floor exercises with and without physical therapy, pessary, and surgical management. We reviewed the different types of surgical procedures including abdominal, vaginal, and robotic/laparoscopic procedures.  Mesh and non-mesh options were reviewed. IUGA information on prolapse was given to her.\par \par She does not want to do invasive options, she is seeing her urologist next wk - will bring results of any imaging, will f/u in 2-3 wks for PVR check if not assessed at urology,  or 6 months if normal PVR at urology, if elevated PVR will need to assess for hydro. All questions answered\par \par

## 2022-10-04 NOTE — OB HISTORY
[Vaginal ___] : [unfilled] vaginal delivery(s) [Definite ___ (Date)] : the last menstrual period was [unfilled] [Last Pap Smear ___] : date of last pap smear was on [unfilled] [Abnormal Pap Smear] : normal pap smear [Taking Estrogens] : is not taking estrogen replacement [Abnormal Bleeding] : without bleeding [Sexually Active] : is not sexually active

## 2022-10-06 LAB
APPEARANCE: CLEAR
BACTERIA UR CULT: NORMAL
BACTERIA: ABNORMAL
BILIRUBIN URINE: NEGATIVE
BLOOD URINE: NEGATIVE
COLOR: NORMAL
GLUCOSE QUALITATIVE U: NEGATIVE
HYALINE CASTS: 0 /LPF
KETONES URINE: NEGATIVE
LEUKOCYTE ESTERASE URINE: ABNORMAL
MICROSCOPIC-UA: NORMAL
NITRITE URINE: NEGATIVE
PH URINE: 7
PROTEIN URINE: NEGATIVE
RED BLOOD CELLS URINE: 1 /HPF
SPECIFIC GRAVITY URINE: 1.01
SQUAMOUS EPITHELIAL CELLS: 3 /HPF
URINE COMMENTS: NORMAL
UROBILINOGEN URINE: NORMAL
WHITE BLOOD CELLS URINE: 39 /HPF

## 2022-12-09 ENCOUNTER — NON-APPOINTMENT (OUTPATIENT)
Age: 82
End: 2022-12-09

## 2022-12-12 ENCOUNTER — APPOINTMENT (OUTPATIENT)
Dept: PULMONOLOGY | Facility: CLINIC | Age: 82
End: 2022-12-12

## 2022-12-12 VITALS
SYSTOLIC BLOOD PRESSURE: 132 MMHG | HEART RATE: 69 BPM | DIASTOLIC BLOOD PRESSURE: 50 MMHG | BODY MASS INDEX: 24.8 KG/M2 | WEIGHT: 140 LBS | RESPIRATION RATE: 16 BRPM | OXYGEN SATURATION: 97 % | HEIGHT: 63 IN

## 2022-12-12 PROCEDURE — 99215 OFFICE O/P EST HI 40 MIN: CPT

## 2022-12-12 NOTE — HISTORY OF PRESENT ILLNESS
[Obstructive Sleep Apnea] : obstructive sleep apnea [Lab] : lab [APAP:] : APAP [Nasal mask] : nasal mask [TextBox_4] : Since last seen patient underwent a right nephrectomy for renal cell carcinoma on 3/21/2022.  All regional lymph nodes were noted and negative by patient history.  She did undergo a CAT scan of the chest which is reviewed below.  She was subsequently seen in [facility] where a lung biopsy was performed but then was sent to an infectious disease specialist in [place] where she did not receive any type of antimicrobial therapy except for observation.  No history of cough wheeze or sputum production. [Awakes Unrefreshed] : does not awaken unrefreshed [Awakes with Dry Mouth] : does not awaken with dry mouth [Awakes with Headache] : does not awaken with headache [Daytime Somnolence] : denies daytime somnolence [Nonrestorative Sleep] : denies nonrestorative sleep [Snoring] : no snoring [Tired while Driving] : not tired while driving [TextBox_77] : 1am [TextBox_79] : 8am [TextBox_81] : 15 [TextBox_89] : 1 [TextBox_100] : 9/20/16 [TextBox_108] : 90.2 [TextBox_104] : 10/26/16 [TextBox_125] : 5-20 [TextBox_127] : 11/12/2022 [TextBox_129] : 12/10/2022 [TextBox_133] : 100 [TextBox_137] : 100 [TextBox_141] : 8 [TextBox_143] : 4 [TextBox_147] : 0.7 [TextBox_158] : Adapt [TextBox_160] : N20 [TextBox_162] : 11/2016 [TextBox_164] : 12/2021 [ESS] : 5

## 2022-12-12 NOTE — DISCUSSION/SUMMARY
[Obstructive Sleep Apnea] : obstructive sleep apnea [Severe] : severe [Responding to Treatment] : responding to treatment [Alcohol Avoidance] : alcohol avoidance [Sedative Avoidance] : sedative avoidance [Weight Loss Program] : weight loss program [CPAP] : CPAP [Patient] : discussed with the patient [FreeTextEntry1] : Multiple pulmonary nodules most likely inflammatory in nature and decreasing in size.  We will continue to observe with a follow-up CAT scan in 6 months in light of the patient's history of renal cell carcinoma [de-identified] : Patient compliant with CPAP/BiPAP and benefiting from therapy

## 2022-12-12 NOTE — END OF VISIT
[>50% of the face to face encounter time was spent on counseling and/or coordination of care for ___] : Greater than 50% of the face to face encounter time was spent on counseling and/or coordination of care for [unfilled] [FreeTextEntry3] : CT results obtained from [facility] radiology website and films reviewed on PACS with patient [Time Spent: ___ minutes] : I have spent [unfilled] minutes of time on the encounter.

## 2022-12-12 NOTE — REVIEW OF SYSTEMS
[Negative] : Endocrine Paramedian Forehead Flap Text: A decision was made to reconstruct the defect utilizing an interpolation axial flap and a staged reconstruction.  A telfa template was made of the defect.  This telfa template was then used to outline the paramedian forehead pedicle flap.  The donor area for the pedicle flap was then injected with anesthesia.  The flap was excised through the skin and subcutaneous tissue down to the layer of the underlying musculature.  The pedicle flap was carefully excised within this deep plane to maintain its blood supply.  The edges of the donor site were undermined.   The donor site was closed in a primary fashion.  The pedicle was then rotated into position and sutured.  Once the tube was sutured into place, adequate blood supply was confirmed with blanching and refill.  The pedicle was then wrapped with xeroform gauze and dressed appropriately with a telfa and gauze bandage to ensure continued blood supply and protect the attached pedicle.

## 2022-12-12 NOTE — CONSULT LETTER
[Dear  ___] : Dear  [unfilled], [Consult Letter:] : I had the pleasure of evaluating your patient, [unfilled]. [Please see my note below.] : Please see my note below. [Consult Closing:] : Thank you very much for allowing me to participate in the care of this patient.  If you have any questions, please do not hesitate to contact me. [Sincerely,] : Sincerely, [FreeTextEntry3] : Prakash Villarreal MD FCCP\par Pulmonary/Critical Care/Sleep Medicine\par Department of Internal Medicine\par \par [facility] [employer]\par

## 2023-04-17 NOTE — ED ADULT NURSE NOTE - NSFALLRSKOUTCOME_ED_ALL_ED
Impression: Vitreous hemorrhage, left eye: H43.12. Plan: Likely due to PVD OS. See plan for RT OS.   See plan above Universal Safety Interventions

## 2023-05-16 ENCOUNTER — APPOINTMENT (OUTPATIENT)
Dept: PULMONOLOGY | Facility: CLINIC | Age: 83
End: 2023-05-16
Payer: MEDICARE

## 2023-05-16 VITALS
DIASTOLIC BLOOD PRESSURE: 67 MMHG | WEIGHT: 143 LBS | BODY MASS INDEX: 25.34 KG/M2 | HEIGHT: 63 IN | HEART RATE: 62 BPM | OXYGEN SATURATION: 97 % | SYSTOLIC BLOOD PRESSURE: 110 MMHG | RESPIRATION RATE: 16 BRPM

## 2023-05-16 PROCEDURE — 99215 OFFICE O/P EST HI 40 MIN: CPT

## 2023-05-16 RX ORDER — TAMSULOSIN HYDROCHLORIDE 0.4 MG/1
0.4 CAPSULE ORAL
Qty: 90 | Refills: 0 | Status: DISCONTINUED | COMMUNITY
Start: 2021-08-21 | End: 2023-05-16

## 2023-05-16 RX ORDER — CANDESARTAN CILEXETIL 32 MG/1
32 TABLET ORAL
Qty: 90 | Refills: 0 | Status: DISCONTINUED | COMMUNITY
Start: 2021-07-24 | End: 2023-05-16

## 2023-05-16 RX ORDER — HYDROCHLOROTHIAZIDE 12.5 MG/1
12.5 TABLET ORAL
Refills: 0 | Status: DISCONTINUED | COMMUNITY
End: 2023-05-16

## 2023-05-16 NOTE — CONSULT LETTER
[Dear  ___] : Dear  [unfilled], [Consult Letter:] : I had the pleasure of evaluating your patient, [unfilled]. [Please see my note below.] : Please see my note below. [Consult Closing:] : Thank you very much for allowing me to participate in the care of this patient.  If you have any questions, please do not hesitate to contact me. [Sincerely,] : Sincerely, [FreeTextEntry3] : Prakash Villarreal MD FCCP\par Pulmonary/Critical Care/Sleep Medicine\par Department of Internal Medicine\par \par [facility] [employer]\par

## 2023-05-16 NOTE — END OF VISIT
[Time Spent: ___ minutes] : I have spent [unfilled] minutes of time on the encounter. [>50% of the face to face encounter time was spent on counseling and/or coordination of care for ___] : Greater than 50% of the face to face encounter time was spent on counseling and/or coordination of care for [unfilled] [FreeTextEntry3] : Films reviewed on PACS with patient

## 2023-05-16 NOTE — DISCUSSION/SUMMARY
[Obstructive Sleep Apnea] : obstructive sleep apnea [Severe] : severe [Responding to Treatment] : responding to treatment [Alcohol Avoidance] : alcohol avoidance [Sedative Avoidance] : sedative avoidance [Weight Loss Program] : weight loss program [CPAP] : CPAP [Patient] : discussed with the patient [FreeTextEntry1] : Multiple pulmonary nodules most likely inflammatory in nature and decreasing in size.  We will continue to observe with a follow-up CAT scan in 6 months in light of the patient's history of renal cell carcinoma [de-identified] : Patient compliant with CPAP/BiPAP and benefiting from therapy

## 2023-05-16 NOTE — HISTORY OF PRESENT ILLNESS
[Obstructive Sleep Apnea] : obstructive sleep apnea [Lab] : lab [APAP:] : APAP [Nasal mask] : nasal mask [TextBox_4] : 83-year-old female with a history of severe obstructive sleep apnea status post right nephrectomy for renal cell carcinoma in March 2022 with an incidental lung nodule found.  She denies any complaints of cough wheeze or sputum production.  Follow-up CAT scan was performed [Awakes Unrefreshed] : does not awaken unrefreshed [Awakes with Dry Mouth] : does not awaken with dry mouth [Awakes with Headache] : does not awaken with headache [Daytime Somnolence] : denies daytime somnolence [Nonrestorative Sleep] : denies nonrestorative sleep [Snoring] : no snoring [Tired while Driving] : not tired while driving [TextBox_77] : 1am [TextBox_79] : 8am [TextBox_81] : 15 [TextBox_89] : 1 [TextBox_100] : 9/20/16 [TextBox_108] : 90.2 [TextBox_104] : 10/26/16 [TextBox_125] : 5-20 [TextBox_127] : 4/15/2023 [TextBox_129] : 5/14/2023 [TextBox_133] : 100 [TextBox_137] : 100 [TextBox_141] : 8 [TextBox_143] : 4 [TextBox_147] : 0.7 [TextBox_158] : Adapt [TextBox_160] : N20 [TextBox_162] : 12/2/2021 [ESS] : 5

## 2023-06-27 ENCOUNTER — APPOINTMENT (OUTPATIENT)
Dept: ORTHOPEDIC SURGERY | Facility: CLINIC | Age: 83
End: 2023-06-27
Payer: MEDICARE

## 2023-06-27 DIAGNOSIS — S80.02XA CONTUSION OF LEFT KNEE, INITIAL ENCOUNTER: ICD-10-CM

## 2023-06-27 PROCEDURE — 99203 OFFICE O/P NEW LOW 30 MIN: CPT

## 2023-06-27 PROCEDURE — 99213 OFFICE O/P EST LOW 20 MIN: CPT

## 2023-06-27 PROCEDURE — 73564 X-RAY EXAM KNEE 4 OR MORE: CPT | Mod: 50

## 2023-06-27 RX ORDER — AMLODIPINE AND ATORVASTATIN 2.5; 4 MG/1; MG/1
TABLET, COATED ORAL
Refills: 0 | Status: ACTIVE | COMMUNITY

## 2023-06-27 NOTE — HISTORY OF PRESENT ILLNESS
[de-identified] : 84 y/o female presents who mid Feburary 2023 fell onto the left knee. She saw outside provider in [place] who took xrays and she states no fractures. There is ecchymosis of the knee. The initial pain and swelling has improved. Intermittent discomfort of the right knee.

## 2023-06-27 NOTE — PHYSICAL EXAM
[Bilateral] : knee bilaterally [NL (0)] : extension 0 degrees [5___] : hamstring 5[unfilled]/5 [Negative] : negative Silke's [] : patient ambulates without assistive device [FreeTextEntry3] : mild ecchymosis lt knee  [TWNoteComboBox7] : flexion 120 degrees

## 2023-06-27 NOTE — DISCUSSION/SUMMARY
[de-identified] : General Dx Discussion\par The patient was advised of the diagnosis. The natural history of the pathology was explained in full to the patient in layman's terms. All questions were answered. The risks and benefits of surgical and non-surgical treatment alternatives were explained in full to the patient.\par  \par will try voltaren gel, she has it at home

## 2023-06-27 NOTE — IMAGING
[Bilateral] : knee bilaterally [FreeTextEntry9] : OA no displaced fx seen cannot r/o occult injury fx

## 2023-07-11 ENCOUNTER — APPOINTMENT (OUTPATIENT)
Dept: ORTHOPEDIC SURGERY | Facility: CLINIC | Age: 83
End: 2023-07-11
Payer: MEDICARE

## 2023-07-11 VITALS — HEIGHT: 63 IN | BODY MASS INDEX: 25.34 KG/M2 | WEIGHT: 143 LBS

## 2023-07-11 DIAGNOSIS — S80.02XD CONTUSION OF LEFT KNEE, SUBSEQUENT ENCOUNTER: ICD-10-CM

## 2023-07-11 PROCEDURE — 20611 DRAIN/INJ JOINT/BURSA W/US: CPT | Mod: RT

## 2023-07-11 PROCEDURE — 99213 OFFICE O/P EST LOW 20 MIN: CPT | Mod: 25

## 2023-07-11 PROCEDURE — J3490M: CUSTOM | Mod: NC

## 2023-07-13 NOTE — PROCEDURE
[Large Joint Injection] : Large joint injection [Right] : of the right [Pain] : pain [Inflammation] : inflammation [Alcohol] : alcohol [Ethyl Chloride sprayed topically] : ethyl chloride sprayed topically [Betadine] : betadine [Sterile technique used] : sterile technique used [___ cc    3mg] :  Betamethasone (Celestone) ~Vcc of 3mg [___ cc    1%] : Lidocaine ~Vcc of 1%  [___ cc    0.25%] : Bupivacaine (Marcaine) ~Vcc of 0.25%  [Effusion] : effusion [] : Patient tolerated procedure well [Call if redness, pain or fever occur] : call if redness, pain or fever occur [Apply ice for 15min out of every hour for the next 12-24 hours as tolerated] : apply ice for 15 minutes out of every hour for the next 12-24 hours as tolerated [Previous OTC use and PT nontherapeutic] : patient has tried OTC's including aspirin, Ibuprofen, Aleve, etc or prescription NSAIDS, and/or exercises at home and/or physical therapy without satisfactory response [Patient had decreased mobility in the joint] : patient had decreased mobility in the joint [Risks, benefits, alternatives discussed / Verbal consent obtained] : the risks benefits, and alternatives have been discussed, and verbal consent was obtained [Prior failure or difficult injection] : prior failure or difficult injection [All ultrasound images have been permanently captured and stored accordingly in our picture archiving and communication system] : All ultrasound images have been permanently captured and stored accordingly in our picture archiving and communication system [Visualization of the needle and placement of injection was performed without complication] : visualization of the needle and placement of injection was performed without complication [de-identified] : 20cc  [de-identified] : serous

## 2023-07-13 NOTE — HISTORY OF PRESENT ILLNESS
[7] : 7 [6] : 6 [Radiating] : radiating [Rest] : rest [Ice] : ice [Retired] : Work status: retired [] : no [FreeTextEntry1] : bilat knees; rt worse than the left [FreeTextEntry7] : down the leg and behind the knee [FreeTextEntry9] : elevation [de-identified] : being on knees too much /over use

## 2023-07-13 NOTE — DISCUSSION/SUMMARY
[de-identified] : General Dx Discussion\par The patient was advised of the diagnosis. The natural history of the pathology was explained in full to the patient in layman's terms. All questions were answered. The risks and benefits of surgical and non-surgical treatment alternatives were explained in full to the patient.\par  \par will aspirate and inject rt knee

## 2023-07-13 NOTE — PHYSICAL EXAM
[Bilateral] : knee bilaterally [NL (0)] : extension 0 degrees [5___] : hamstring 5[unfilled]/5 [Negative] : negative Silke's [] : no pain with varus stress [FreeTextEntry3] : mild ecchymosis lt knee resolving small effusion rt knee  [TWNoteComboBox7] : flexion 120 degrees

## 2023-08-08 ENCOUNTER — APPOINTMENT (OUTPATIENT)
Dept: ORTHOPEDIC SURGERY | Facility: CLINIC | Age: 83
End: 2023-08-08
Payer: MEDICARE

## 2023-08-08 VITALS — WEIGHT: 143 LBS | BODY MASS INDEX: 25.34 KG/M2 | HEIGHT: 63 IN

## 2023-08-08 PROCEDURE — 20611 DRAIN/INJ JOINT/BURSA W/US: CPT | Mod: 50

## 2023-08-08 PROCEDURE — 99212 OFFICE O/P EST SF 10 MIN: CPT | Mod: 25

## 2023-08-08 NOTE — PROCEDURE
[Large Joint Injection] : Large joint injection [Bilateral] : bilaterally of the [Knee] : knee [Pain] : pain [Inflammation] : inflammation [X-ray evidence of Osteoarthritis on this or prior visit] : x-ray evidence of Osteoarthritis on this or prior visit [Alcohol] : alcohol [Betadine] : betadine [Ethyl Chloride sprayed topically] : ethyl chloride sprayed topically [Sterile technique used] : sterile technique used [Orthovisc (30mg)] : 30mg of Orthovisc [#1] : series #1 [] : Patient tolerated procedure well [Call if redness, pain or fever occur] : call if redness, pain or fever occur [Apply ice for 15min out of every hour for the next 12-24 hours as tolerated] : apply ice for 15 minutes out of every hour for the next 12-24 hours as tolerated [Previous OTC use and PT nontherapeutic] : patient has tried OTC's including aspirin, Ibuprofen, Aleve, etc or prescription NSAIDS, and/or exercises at home and/or physical therapy without satisfactory response [Patient had decreased mobility in the joint] : patient had decreased mobility in the joint [Risks, benefits, alternatives discussed / Verbal consent obtained] : the risks benefits, and alternatives have been discussed, and verbal consent was obtained [Altered anatomic landmarks d/t erosive arthritis] : altered anatomic landmarks d/t erosive arthritis [All ultrasound images have been permanently captured and stored accordingly in our picture archiving and communication system] : All ultrasound images have been permanently captured and stored accordingly in our picture archiving and communication system

## 2023-08-08 NOTE — HISTORY OF PRESENT ILLNESS
[8] : 8 [5] : 5 [Localized] : localized [Walking] : walking [de-identified] : Here for f/u knees. She did feel better after right knee aspiration/injection on 7/11/23, but she feels she still has some swelling with pain. Mild left knee pain. [] : no [FreeTextEntry1] : bilat knees [FreeTextEntry9] : voltaren at night

## 2023-08-08 NOTE — PHYSICAL EXAM
[Left] : left knee [Negative] : negative Silke's [Right] : right knee [NL (0)] : extension 0 degrees [5___] : hamstring 5[unfilled]/5 [] : patient ambulates without assistive device [TWNoteComboBox7] : flexion 115 degrees

## 2023-08-08 NOTE — DISCUSSION/SUMMARY
[de-identified] : General Dx Discussion The patient was advised of the diagnosis. The natural history of the pathology was explained in full to the patient in layman's terms. All questions were answered. The risks and benefits of surgical and non-surgical treatment alternatives were explained in full to the patient.   Case Discussed. Recommend visco injections.  Start orthovisc today b/l knees and tolerated well.  f/u 1 week to continue series.   Entered by Deidre RODRIGUEZ acting as a scribe. Instructions: Dr. Hugo- The documentation recorded by the scribe accurately reflects the service I personally performed and the decisions made by me.

## 2023-08-15 ENCOUNTER — APPOINTMENT (OUTPATIENT)
Dept: ORTHOPEDIC SURGERY | Facility: CLINIC | Age: 83
End: 2023-08-15
Payer: MEDICARE

## 2023-08-15 VITALS — WEIGHT: 143 LBS | HEIGHT: 63 IN | BODY MASS INDEX: 25.34 KG/M2

## 2023-08-15 PROCEDURE — 99024 POSTOP FOLLOW-UP VISIT: CPT

## 2023-08-15 PROCEDURE — 20611 DRAIN/INJ JOINT/BURSA W/US: CPT | Mod: 50

## 2023-08-15 NOTE — HISTORY OF PRESENT ILLNESS
[Localized] : localized [Stabbing] : stabbing [Intermittent] : intermittent [2] : 2 [Orthovisc] : Orthovisc [] : no [FreeTextEntry1] : bilat knees [FreeTextEntry5] : rt knee has a pain level of 5 or 6 [FreeTextEntry9] : voltaren [de-identified] : 8-8-23 [de-identified] : bilat knees [de-identified] : rt knee pain

## 2023-08-15 NOTE — PROCEDURE
[Large Joint Injection] : Large joint injection [Bilateral] : bilaterally of the [Knee] : knee [Pain] : pain [Inflammation] : inflammation [X-ray evidence of Osteoarthritis on this or prior visit] : x-ray evidence of Osteoarthritis on this or prior visit [Alcohol] : alcohol [Betadine] : betadine [Ethyl Chloride sprayed topically] : ethyl chloride sprayed topically [Sterile technique used] : sterile technique used [Orthovisc (30mg)] : 30mg of Orthovisc [#2] : series #2 [] : Patient tolerated procedure well [Call if redness, pain or fever occur] : call if redness, pain or fever occur [Apply ice for 15min out of every hour for the next 12-24 hours as tolerated] : apply ice for 15 minutes out of every hour for the next 12-24 hours as tolerated [Previous OTC use and PT nontherapeutic] : patient has tried OTC's including aspirin, Ibuprofen, Aleve, etc or prescription NSAIDS, and/or exercises at home and/or physical therapy without satisfactory response [Patient had decreased mobility in the joint] : patient had decreased mobility in the joint [Risks, benefits, alternatives discussed / Verbal consent obtained] : the risks benefits, and alternatives have been discussed, and verbal consent was obtained [Altered anatomic landmarks d/t erosive arthritis] : altered anatomic landmarks d/t erosive arthritis [All ultrasound images have been permanently captured and stored accordingly in our picture archiving and communication system] : All ultrasound images have been permanently captured and stored accordingly in our picture archiving and communication system

## 2023-08-15 NOTE — PHYSICAL EXAM
[Left] : left knee [Negative] : negative Silke's [Right] : right knee [NL (0)] : extension 0 degrees [5___] : hamstring 5[unfilled]/5 [] : patient ambulates without assistive device [TWNoteComboBox7] : flexion 120 degrees

## 2023-08-15 NOTE — DISCUSSION/SUMMARY
[de-identified] : General Dx Discussion The patient was advised of the diagnosis. The natural history of the pathology was explained in full to the patient in layman's terms. All questions were answered. The risks and benefits of surgical and non-surgical treatment alternatives were explained in full to the patient.   Case Discussed.  orthovisc today b/l knees and tolerated well.  f/u 1 week to continue series.   Entered by Deidre RODRIGUEZ acting as a scribe. Instructions: Dr. Hugo- The documentation recorded by the scribe accurately reflects the service I personally performed and the decisions made by me.

## 2023-08-22 ENCOUNTER — APPOINTMENT (OUTPATIENT)
Dept: ORTHOPEDIC SURGERY | Facility: CLINIC | Age: 83
End: 2023-08-22
Payer: MEDICARE

## 2023-08-22 VITALS — BODY MASS INDEX: 25.34 KG/M2 | WEIGHT: 143 LBS | HEIGHT: 63 IN

## 2023-08-22 PROCEDURE — 20611 DRAIN/INJ JOINT/BURSA W/US: CPT | Mod: 50

## 2023-08-22 NOTE — HISTORY OF PRESENT ILLNESS
[1] : 2 [0] : 0 [Localized] : localized [Shooting] : shooting [Stabbing] : stabbing [Injection therapy] : injection therapy [Exercising] : exercising [3] : 3 [Orthovisc] : Orthovisc [de-identified] : Here for f/u knees for orthovisc injections.  [] : no [FreeTextEntry1] : knees [FreeTextEntry6] : on right knee [de-identified] : 8-15-23 [de-identified] : knees

## 2023-08-22 NOTE — DISCUSSION/SUMMARY
[de-identified] : General Dx Discussion The patient was advised of the diagnosis. The natural history of the pathology was explained in full to the patient in layman's terms. All questions were answered. The risks and benefits of surgical and non-surgical treatment alternatives were explained in full to the patient.   Case Discussed.  orthovisc today b/l knees and tolerated well.  f/u 1 week to continue series.   Entered by Deidre RODRIGUEZ acting as a scribe. Instructions: Dr. Hugo- The documentation recorded by the scribe accurately reflects the service I personally performed and the decisions made by me.

## 2023-08-22 NOTE — PROCEDURE
[Large Joint Injection] : Large joint injection [Bilateral] : bilaterally of the [Knee] : knee [Pain] : pain [Inflammation] : inflammation [X-ray evidence of Osteoarthritis on this or prior visit] : x-ray evidence of Osteoarthritis on this or prior visit [Alcohol] : alcohol [Betadine] : betadine [Ethyl Chloride sprayed topically] : ethyl chloride sprayed topically [Sterile technique used] : sterile technique used [Orthovisc (30mg)] : 30mg of Orthovisc [#3] : series #3 [] : Patient tolerated procedure well [Call if redness, pain or fever occur] : call if redness, pain or fever occur [Apply ice for 15min out of every hour for the next 12-24 hours as tolerated] : apply ice for 15 minutes out of every hour for the next 12-24 hours as tolerated [Previous OTC use and PT nontherapeutic] : patient has tried OTC's including aspirin, Ibuprofen, Aleve, etc or prescription NSAIDS, and/or exercises at home and/or physical therapy without satisfactory response [Patient had decreased mobility in the joint] : patient had decreased mobility in the joint [Risks, benefits, alternatives discussed / Verbal consent obtained] : the risks benefits, and alternatives have been discussed, and verbal consent was obtained [Altered anatomic landmarks d/t erosive arthritis] : altered anatomic landmarks d/t erosive arthritis [All ultrasound images have been permanently captured and stored accordingly in our picture archiving and communication system] : All ultrasound images have been permanently captured and stored accordingly in our picture archiving and communication system

## 2023-08-29 ENCOUNTER — APPOINTMENT (OUTPATIENT)
Dept: ORTHOPEDIC SURGERY | Facility: CLINIC | Age: 83
End: 2023-08-29
Payer: MEDICARE

## 2023-08-29 VITALS — HEIGHT: 63 IN | BODY MASS INDEX: 25.34 KG/M2 | WEIGHT: 143 LBS

## 2023-08-29 DIAGNOSIS — M17.12 UNILATERAL PRIMARY OSTEOARTHRITIS, LEFT KNEE: ICD-10-CM

## 2023-08-29 PROCEDURE — 20611 DRAIN/INJ JOINT/BURSA W/US: CPT | Mod: 50

## 2023-08-29 PROCEDURE — 99024 POSTOP FOLLOW-UP VISIT: CPT

## 2023-08-29 NOTE — REVIEW OF SYSTEMS
[Joint Pain] : joint pain [Negative] : Heme/Lymph [Joint Swelling] : joint swelling [FreeTextEntry9] : ankles

## 2023-08-29 NOTE — PROCEDURE
[Large Joint Injection] : Large joint injection [Bilateral] : bilaterally of the [Knee] : knee [Pain] : pain [Inflammation] : inflammation [X-ray evidence of Osteoarthritis on this or prior visit] : x-ray evidence of Osteoarthritis on this or prior visit [Alcohol] : alcohol [Betadine] : betadine [Ethyl Chloride sprayed topically] : ethyl chloride sprayed topically [Sterile technique used] : sterile technique used [Orthovisc (30mg)] : 30mg of Orthovisc [#4] : series #4 [] : Patient tolerated procedure well [Call if redness, pain or fever occur] : call if redness, pain or fever occur [Apply ice for 15min out of every hour for the next 12-24 hours as tolerated] : apply ice for 15 minutes out of every hour for the next 12-24 hours as tolerated [Previous OTC use and PT nontherapeutic] : patient has tried OTC's including aspirin, Ibuprofen, Aleve, etc or prescription NSAIDS, and/or exercises at home and/or physical therapy without satisfactory response [Patient had decreased mobility in the joint] : patient had decreased mobility in the joint [Risks, benefits, alternatives discussed / Verbal consent obtained] : the risks benefits, and alternatives have been discussed, and verbal consent was obtained [Altered anatomic landmarks d/t erosive arthritis] : altered anatomic landmarks d/t erosive arthritis [All ultrasound images have been permanently captured and stored accordingly in our picture archiving and communication system] : All ultrasound images have been permanently captured and stored accordingly in our picture archiving and communication system

## 2023-08-29 NOTE — PHYSICAL EXAM
[Left] : left knee [Negative] : negative Silke's [Right] : right knee [NL (0)] : extension 0 degrees [5___] : hamstring 5[unfilled]/5 [] : no tenderness [TWNoteComboBox7] : flexion 120 degrees

## 2023-08-29 NOTE — DISCUSSION/SUMMARY
[de-identified] : General Dx Discussion The patient was advised of the diagnosis. The natural history of the pathology was explained in full to the patient in layman's terms. All questions were answered. The risks and benefits of surgical and non-surgical treatment alternatives were explained in full to the patient.   Case Discussed.  orthovisc today b/l knees and tolerated well.  Recommend PT. f/u 6 weeks.   Entered by Gabrielle RODRIGUEZ acting as a scribe. Instructions: Dr. Hugo- The documentation recorded by the scribe accurately reflects the service I personally performed and the decisions made by me.

## 2023-08-29 NOTE — HISTORY OF PRESENT ILLNESS
[Orthovisc] : Orthovisc [3] : 3 [0] : 0 [Localized] : localized [Shooting] : shooting [4] : 4 [de-identified] : Here for f/u knees for orthovisc injections.  [] : no [FreeTextEntry1] : bilat knees [FreeTextEntry9] : sitting with elevation [de-identified] : getting up [de-identified] : 8-22-23 [de-identified] : knees

## 2023-09-19 ENCOUNTER — APPOINTMENT (OUTPATIENT)
Dept: ORTHOPEDIC SURGERY | Facility: CLINIC | Age: 83
End: 2023-09-19
Payer: MEDICARE

## 2023-09-19 VITALS — WEIGHT: 143 LBS | BODY MASS INDEX: 25.34 KG/M2 | HEIGHT: 63 IN

## 2023-09-19 DIAGNOSIS — M25.461 EFFUSION, RIGHT KNEE: ICD-10-CM

## 2023-09-19 PROCEDURE — 20611 DRAIN/INJ JOINT/BURSA W/US: CPT | Mod: RT

## 2023-09-19 PROCEDURE — J3490M: CUSTOM | Mod: NC

## 2023-09-19 PROCEDURE — 99213 OFFICE O/P EST LOW 20 MIN: CPT | Mod: 25

## 2023-09-22 ENCOUNTER — APPOINTMENT (OUTPATIENT)
Dept: MRI IMAGING | Facility: CLINIC | Age: 83
End: 2023-09-22

## 2023-09-25 ENCOUNTER — APPOINTMENT (OUTPATIENT)
Dept: MRI IMAGING | Facility: CLINIC | Age: 83
End: 2023-09-25
Payer: MEDICARE

## 2023-09-25 ENCOUNTER — TRANSCRIPTION ENCOUNTER (OUTPATIENT)
Age: 83
End: 2023-09-25

## 2023-09-25 PROCEDURE — 73721 MRI JNT OF LWR EXTRE W/O DYE: CPT | Mod: RT,MH

## 2023-09-26 ENCOUNTER — APPOINTMENT (OUTPATIENT)
Dept: ORTHOPEDIC SURGERY | Facility: CLINIC | Age: 83
End: 2023-09-26
Payer: MEDICARE

## 2023-09-26 VITALS — HEIGHT: 63 IN | BODY MASS INDEX: 25.34 KG/M2 | WEIGHT: 143 LBS

## 2023-09-26 PROCEDURE — 20611 DRAIN/INJ JOINT/BURSA W/US: CPT | Mod: RT

## 2023-09-26 PROCEDURE — 99214 OFFICE O/P EST MOD 30 MIN: CPT | Mod: 25

## 2023-09-26 PROCEDURE — J3490M: CUSTOM | Mod: NC

## 2023-10-10 ENCOUNTER — APPOINTMENT (OUTPATIENT)
Dept: ORTHOPEDIC SURGERY | Facility: CLINIC | Age: 83
End: 2023-10-10

## 2023-11-07 ENCOUNTER — APPOINTMENT (OUTPATIENT)
Dept: ORTHOPEDIC SURGERY | Facility: CLINIC | Age: 83
End: 2023-11-07

## 2023-11-20 ENCOUNTER — APPOINTMENT (OUTPATIENT)
Dept: PULMONOLOGY | Facility: CLINIC | Age: 83
End: 2023-11-20
Payer: MEDICARE

## 2023-11-20 VITALS
HEART RATE: 78 BPM | BODY MASS INDEX: 25.69 KG/M2 | HEIGHT: 63 IN | RESPIRATION RATE: 16 BRPM | WEIGHT: 145 LBS | OXYGEN SATURATION: 94 % | DIASTOLIC BLOOD PRESSURE: 65 MMHG | SYSTOLIC BLOOD PRESSURE: 135 MMHG

## 2023-11-20 PROCEDURE — 99215 OFFICE O/P EST HI 40 MIN: CPT

## 2024-02-09 ENCOUNTER — APPOINTMENT (OUTPATIENT)
Dept: ORTHOPEDIC SURGERY | Facility: CLINIC | Age: 84
End: 2024-02-09
Payer: MEDICARE

## 2024-02-09 VITALS
HEIGHT: 63 IN | HEART RATE: 62 BPM | WEIGHT: 150 LBS | BODY MASS INDEX: 26.58 KG/M2 | SYSTOLIC BLOOD PRESSURE: 175 MMHG | DIASTOLIC BLOOD PRESSURE: 73 MMHG

## 2024-02-09 DIAGNOSIS — E66.3 OVERWEIGHT: ICD-10-CM

## 2024-02-09 DIAGNOSIS — M25.561 PAIN IN RIGHT KNEE: ICD-10-CM

## 2024-02-09 PROCEDURE — 20610 DRAIN/INJ JOINT/BURSA W/O US: CPT | Mod: RT

## 2024-02-09 PROCEDURE — 99204 OFFICE O/P NEW MOD 45 MIN: CPT | Mod: 25

## 2024-02-09 PROCEDURE — 73562 X-RAY EXAM OF KNEE 3: CPT | Mod: RT

## 2024-02-09 NOTE — HISTORY OF PRESENT ILLNESS
[Worsening] : worsening [6] : a current pain level of 6/10 [3] : a minimum pain level of 3/10 [9] : a maximum pain level of 9/10 [de-identified] : Pari is a 84-year-old female well-known to the practice, previously seen in 2016 for her right hip who does present today for initial evaluation of a new complaint of right knee pain which began in July/august of 2023 without injury.  She presents today with a chief complaint of intermittent, moderate dull aching pain greatest over the medial aspect of the knee.  Most pronounced at night following activity or with any prolonged walking.  She reports mild stiffness.  No catching, locking or buckling.  No rating pain or paresthesia.  She denies any groin pain. She has been under the care of another orthopedist in September.  Treatment has consisted of activity modification, 20 visits of formalized physical therapy, a course of hyaluronic acid injections as well as a corticosteroid injection.  She denies any relief with these conservative measures.  Her last injection was a Zilretta injection on 9/26/23.   She was also referred for an MRI of the right knee and does present today with the study.  She presents today to discuss alternative treatment options. Patient denies a history of rheumatoid arthritis or other rheumatologic disorder.  BMI: 26.  She reports a history of sleep apnea.  She is a nondiabetic.  No history of anticoagulant use. Non smoker. The pt has one kidney secondary to kidney cancer. She is unable to take NSAIDs.

## 2024-02-09 NOTE — DISCUSSION/SUMMARY
[Medication Risks Reviewed] : Medication risks reviewed [Surgical risks reviewed] : Surgical risks reviewed [de-identified] : 85 y/o F pt presents with advanced lateral compartmental osteoarthritis of the right knee. The nature of her condition and treatment options were discussed in detail. We reviewed the pt's MRI which shows Tricompartmental arthrosis and complex degenerative tears of the medial lateral meniscus. The pt is a future candidate for a right TKA. The surgery was discussed in detail. We discussed conservative treatment such as cortisone injections, HA injections, PT, anti-inflammatories, and low impact exercise. We provided the pt with an aspiration/cortisone injection which she tolerated well. The pt has exhausted conservative treatment which consisted of activity modification, 20 visits of formalized physical therapy, a course of hyaluronic acid injections as well as a corticosteroid injection.  She denies any relief with these conservative measures. If the pt shows no relief with today's injection, we may pursue a total knee replacement. The pt will f/u in 3 months for re-evaluation.   The patient is a 84 year year old individual with end stage arthritis of their right knee joint. The patient has exhausted a minimum of 3 months conservative treatment including prior injections (cortisone and/or hyaluronic acid injections), physical therapy, over the counter NSAIDS and pain medication where indicated.  In addition, the patient's quality of life is diminished due to significant chronic pain. The patient is having difficulty with activities of daily living, including ambulating, descending stairs, and rising from a seated position. Based upon the patients continued symptoms and failure to respond to conservative treatment, I have recommended a right total knee replacement for this patient. A long discussion took place with the patient describing what a total joint replacement is and what the procedure would entail. A knee model, similar to the implant that will be used during the operation, was utilized to demonstrate and to discuss the various bearing surfaces of the implants. Implant fixation, use of cement, was also discussed with the patient. Choices of implant manufacturers, mainly [employer] and [employer], were discussed and reviewed with preference to be made by patient and surgeon prior to operation. Final selection to be based on customary practice as well as preoperative templating with selection confirmed intraoperatively based on the patient's anatomy. The patient participated and agreed with the decision-making process. The hospitalization and post-operative care and rehabilitation were also discussed. The use of perioperative antibiotics and DVT prophylaxis were discussed. The risk, benefits and alternatives to a surgical intervention were discussed at length with the patient. The patient was also advised of risks related to the medical comorbidities and elevated body mass index (BMI). A lengthy discussion took place to review the most common complications including but not limited to: deep vein thrombosis, pulmonary embolism, heart attack, stroke, infection, wound breakdown, numbness, damage to nerves, tendon, muscles, arteries or other blood vessels, death and other possible complications from anesthesia. The patient was told that we will take steps to minimize these risks by using sterile technique, antibiotics and DVT prophylaxis when appropriate and follow the patient postoperatively in the office setting to monitor progress. The possibility of recurrent pain, no improvement in pain and actual worsening of pain were also discussed with the patient.    The discharge plan of care focused on the patient going home following surgery. The patient was encouraged to make the necessary arrangements to have someone stay with them when they are discharged home. Following discharge, a home care nurse will visit the patient. The home care nurse will open your home care case and request home physical therapy services. Home physical therapy will commence following discharge provided it is appropriate and covered by the health insurance benefit plan.   The benefits of surgery were discussed with the patient including the potential for improving his/her current clinical condition through operative intervention. Alternatives to surgical intervention including continued conservative management were also discussed in detail. All questions were answered to the satisfaction of the patient. The treatment plan of care, as well as a model of a total knee equivalent to the one that will be used for their total joint replacement, was shared with the patient. The patient participated and agreed to the plan of care as well as the use of the recommended implants for their total joint replacement surgery.

## 2024-02-09 NOTE — PHYSICAL EXAM
[de-identified] : GENERAL APPEARANCE: Well nourished and hydrated, pleasant, alert, and oriented x 3. Appears their stated age.  HEENT: Normocephalic, extraocular eye motion intact. Nasal septum midline. Oral cavity clear. External auditory canal clear.  RESPIRATORY: Breath sounds clear and audible in all lobes. No wheezing, No accessory muscle use. CARDIOVASCULAR: No apparent abnormalities. No lower leg edema. No varicosities. Pedal pulses are palpable. NEUROLOGIC: Sensation is normal, no muscle weakness in the upper or lower extremities. DERMATOLOGIC: No apparent skin lesions, moist, warm, no rash. SPINE: Cervical spine appears normal and moves freely; thoracic spine appears normal and moves freely; lumbosacral spine appears normal and moves freely, normal, nontender. MUSCULOSKELETAL: Hands, wrists, and elbows are normal and move freely, shoulders are normal and move freely.  PSYCHIATRIC: Oriented to person, place, and time, insight and judgement were intact and the affect was normal.  RIGHT KNEE EXAM shows no effusion, ROM is 0-120 degrees, no instability, mild medial and lateral joint line pain with Silke, anterior medial joint line tenderness. Strength:   Quadriceps- 5/5        Hamstrings- 5/5 [de-identified] : 3V xray of the right knee done in the office today and reviewed by Dr. Tim Rodriguez demonstrates advanced lateral compartmental osteoarthritis of the right knee   We did also review the patient's noncontrast MRI of the right knee performed at an outside facility on 9/25/2023.  Full report in chart.  This report did comment on evidence of tricompartmental arthrosis and complex degenerative tears of the medial lateral meniscus.  A large joint effusion and generalized synovitis was also appreciated with a large popliteal cyst containing hemorrhagic and synovial debris.  The report comments on possible inflammatory arthropathy in addition to degenerative arthrosis. Cody.

## 2024-02-09 NOTE — PROCEDURE
[de-identified] : I aspirated 50 cc's of clear/yellow synovial fluid from the patients right knee.		   I injected the patient's right knee today with cortisone for primary osteoarthritis.  I discussed at length with the patient the planned steroid and lidocaine injection. The risks, benefits, convalescence and alternatives were reviewed. The possible side effects discussed included but were not limited to: pain, swelling, heat, bleeding, and redness. Symptoms are generally mild but if they are extensive then contact the office. Giving pain relievers by mouth such as NSAIDs or Tylenol can generally treat the reactions to steroid and lidocaine. Rare cases of infection have been noted. Rash, hives and itching may occur post injection. If you have muscle pain or cramps, flushing and or swelling of the face, rapid heart beat, nausea, dizziness, fever, chills, headache, difficulty breathing, swelling in the arms or legs, or have a prickly feeling of your skin, contact a health care provider immediately. Following this discussion, the knee was prepped with Alcohol and under sterile condition the 80 mg Depo-Medrol and 6 cc Lidocaine injection was performed with a 20 gauge needle through a superolateral injection site. The needle was introduced into the joint, aspiration was performed to ensure intra-articular placement and the medication was injected. Upon withdrawal of the needle the site was cleaned with alcohol and a band aid applied. The patient tolerated the injection well and there were no adverse effects. Post injection instructions included no strenuous activity for 24 hours, cryotherapy and if there are any adverse effects to contact the office.

## 2024-02-22 ENCOUNTER — APPOINTMENT (OUTPATIENT)
Dept: PULMONOLOGY | Facility: CLINIC | Age: 84
End: 2024-02-22
Payer: MEDICARE

## 2024-02-22 VITALS
HEIGHT: 63 IN | WEIGHT: 150 LBS | RESPIRATION RATE: 16 BRPM | DIASTOLIC BLOOD PRESSURE: 64 MMHG | SYSTOLIC BLOOD PRESSURE: 126 MMHG | HEART RATE: 58 BPM | BODY MASS INDEX: 26.58 KG/M2 | OXYGEN SATURATION: 96 %

## 2024-02-22 PROCEDURE — G2211 COMPLEX E/M VISIT ADD ON: CPT

## 2024-02-22 PROCEDURE — 99214 OFFICE O/P EST MOD 30 MIN: CPT

## 2024-02-22 NOTE — HISTORY OF PRESENT ILLNESS
[Obstructive Sleep Apnea] : obstructive sleep apnea [Lab] : lab [APAP:] : APAP [Nasal mask] : nasal mask [TextBox_4] : 83-year-old female with a history of severe obstructive sleep apnea status post right nephrectomy for renal cell carcinoma in March 2022 with an incidental lung nodule found.  Patient did undergo a PET scan at that time which is new information as well as a transthoracic needle biopsy which is reported as negative.  She denies any complaints of cough wheeze or sputum production.  She was strongly considering not having a PET scan done but instead a follow-up CAT scan [Awakes Unrefreshed] : does not awaken unrefreshed [Awakes with Dry Mouth] : does not awaken with dry mouth [Daytime Somnolence] : denies daytime somnolence [Awakes with Headache] : does not awaken with headache [Snoring] : no snoring [Nonrestorative Sleep] : denies nonrestorative sleep [Tired while Driving] : not tired while driving [TextBox_79] : 8am [TextBox_77] : 1am [TextBox_81] : 15 [TextBox_100] : 9/20/16 [TextBox_89] : 1 [TextBox_108] : 90.2 [TextBox_104] : 10/26/16 [TextBox_125] : 5-20 [TextBox_129] : 2/20/2024 [TextBox_127] : 1/22/2024 [TextBox_141] : 7 [TextBox_137] : 100 [TextBox_133] : 100 [TextBox_147] : 1.0 [TextBox_143] : 32 [TextBox_158] : [employer] [TextBox_162] : 12/2/2021 [TextBox_160] : N20, [employer] [ESS] : 5

## 2024-02-22 NOTE — CONSULT LETTER
[Dear  ___] : Dear  [unfilled], [Consult Letter:] : I had the pleasure of evaluating your patient, [unfilled]. [Consult Closing:] : Thank you very much for allowing me to participate in the care of this patient.  If you have any questions, please do not hesitate to contact me. [Please see my note below.] : Please see my note below. [Sincerely,] : Sincerely, [FreeTextEntry3] : Prakash Villarreal MD FCCP\par  Pulmonary/Critical Care/Sleep Medicine\par  Department of Internal Medicine\par  \par  [employer] [employer] [employer]\par

## 2024-02-22 NOTE — DISCUSSION/SUMMARY
[Severe] : severe [Obstructive Sleep Apnea] : obstructive sleep apnea [Alcohol Avoidance] : alcohol avoidance [Responding to Treatment] : responding to treatment [Sedative Avoidance] : sedative avoidance [CPAP] : CPAP [Weight Loss Program] : weight loss program [Patient] : discussed with the patient [FreeTextEntry1] : Multiple pulmonary nodules most likely postinflammatory in nature with a very small increase in the left upper lobe nodule.  In light of the update in history the patient now agrees to have a follow-up CAT scan instead of the PET scan in 6 months from the date of her original CT.  She will provide me with her previous biopsy results from 2022 [de-identified] : Patient compliant with CPAP/BiPAP and benefiting from therapy

## 2024-02-22 NOTE — PHYSICAL EXAM
[No Acute Distress] : no acute distress [Normal Appearance] : normal appearance [Normal Oropharynx] : normal oropharynx [No Neck Mass] : no neck mass [Normal S1, S2] : normal s1, s2 [Normal Rate/Rhythm] : normal rate/rhythm [No Resp Distress] : no resp distress [No Murmurs] : no murmurs [Clear to Auscultation Bilaterally] : clear to auscultation bilaterally [Benign] : benign [No Abnormalities] : no abnormalities [No Clubbing] : no clubbing [Normal Gait] : normal gait [No Cyanosis] : no cyanosis [No Edema] : no edema [Normal Color/ Pigmentation] : normal color/ pigmentation [FROM] : FROM [No Focal Deficits] : no focal deficits [Oriented x3] : oriented x3 [Normal Affect] : normal affect

## 2024-02-22 NOTE — RESULTS/DATA
[TextEntry] : 11/6/2023: Noncontrast chest CT, [facility] radiology-compared to previous film of 5/1/2023 again noted is a irregular spiculated nodule in the right lower lobe measuring 1.3 x 1.0 cm previously measuring 3.3 and 3.2 cm.  There is an adjacent nodularity and linear atelectasis.  812 x 8 mm in nodularity in the left upper lobe previously measuring 9 x 8 mm.  Possible increase.

## 2024-03-01 ENCOUNTER — APPOINTMENT (OUTPATIENT)
Dept: NUCLEAR MEDICINE | Facility: CLINIC | Age: 84
End: 2024-03-01

## 2024-05-09 ENCOUNTER — APPOINTMENT (OUTPATIENT)
Dept: ORTHOPEDIC SURGERY | Facility: CLINIC | Age: 84
End: 2024-05-09
Payer: MEDICARE

## 2024-05-09 VITALS
BODY MASS INDEX: 26.58 KG/M2 | WEIGHT: 150 LBS | HEIGHT: 63 IN | DIASTOLIC BLOOD PRESSURE: 70 MMHG | SYSTOLIC BLOOD PRESSURE: 168 MMHG | HEART RATE: 72 BPM

## 2024-05-09 PROCEDURE — 99213 OFFICE O/P EST LOW 20 MIN: CPT | Mod: 25

## 2024-05-09 PROCEDURE — 20610 DRAIN/INJ JOINT/BURSA W/O US: CPT | Mod: RT

## 2024-05-09 NOTE — PHYSICAL EXAM
[de-identified] : GENERAL APPEARANCE: Well nourished and hydrated, pleasant, alert, and oriented x 3. Appears their stated age.  HEENT: Normocephalic, extraocular eye motion intact. Nasal septum midline. Oral cavity clear. External auditory canal clear.  RESPIRATORY: Breath sounds clear and audible in all lobes. No wheezing, No accessory muscle use. CARDIOVASCULAR: No apparent abnormalities. No lower leg edema. No varicosities. Pedal pulses are palpable. NEUROLOGIC: Sensation is normal, no muscle weakness in the upper or lower extremities. DERMATOLOGIC: No apparent skin lesions, moist, warm, no rash. SPINE: Cervical spine appears normal and moves freely; thoracic spine appears normal and moves freely; lumbosacral spine appears normal and moves freely, normal, nontender. MUSCULOSKELETAL: Hands, wrists, and elbows are normal and move freely, shoulders are normal and move freely.  PSYCHIATRIC: Oriented to person, place, and time, insight and judgement were intact and the affect was normal.  RIGHT KNEE EXAM shows no effusion, ROM is 0-120 degrees, no instability, mild medial and lateral joint line pain with Silke, anterior medial joint line tenderness. Strength:   Quadriceps- 5/5        Hamstrings- 5/5 [de-identified] : 3V xray of the right knee done in the office today and reviewed by Dr. Tim Rodriguez demonstrates advanced lateral compartmental osteoarthritis of the right knee   We did also review the patient's noncontrast MRI of the right knee performed at an outside facility on 9/25/2023.  Full report in chart.  This report did comment on evidence of tricompartmental arthrosis and complex degenerative tears of the medial lateral meniscus.  A large joint effusion and generalized synovitis was also appreciated with a large popliteal cyst containing hemorrhagic and synovial debris.  The report comments on possible inflammatory arthropathy in addition to degenerative arthrosis. Cody.

## 2024-05-09 NOTE — PROCEDURE
[de-identified] : I aspirated 20 cc's of clear/yellow synovial fluid from the patients right knee.		   I injected the patient's right knee today with cortisone for primary osteoarthritis.  I discussed at length with the patient the planned steroid and lidocaine injection. The risks, benefits, convalescence and alternatives were reviewed. The possible side effects discussed included but were not limited to: pain, swelling, heat, bleeding, and redness. Symptoms are generally mild but if they are extensive then contact the office. Giving pain relievers by mouth such as NSAIDs or Tylenol can generally treat the reactions to steroid and lidocaine. Rare cases of infection have been noted. Rash, hives and itching may occur post injection. If you have muscle pain or cramps, flushing and or swelling of the face, rapid heart beat, nausea, dizziness, fever, chills, headache, difficulty breathing, swelling in the arms or legs, or have a prickly feeling of your skin, contact a health care provider immediately. Following this discussion, the knee was prepped with Alcohol and under sterile condition the 80 mg Depo-Medrol and 6 cc Lidocaine injection was performed with a 20 gauge needle through a superolateral injection site. The needle was introduced into the joint, aspiration was performed to ensure intra-articular placement and the medication was injected. Upon withdrawal of the needle the site was cleaned with alcohol and a band aid applied. The patient tolerated the injection well and there were no adverse effects. Post injection instructions included no strenuous activity for 24 hours, cryotherapy and if there are any adverse effects to contact the office.

## 2024-05-09 NOTE — HISTORY OF PRESENT ILLNESS
[de-identified] : Patient is 84 year old female here for follow up of right knee pain. She presents today with a chief complaint of intermittent, moderate dull aching pain greatest over the medial aspect of the knee.  Most pronounced at night following activity or with any prolonged walking.  She reports mild stiffness.  No catching, locking or buckling.  No rating pain or paresthesia.  She denies any groin pain. She had aspiration and cortisone injection at her last visit which she reports she did very well with. She has her grandsons wedding in July and comes in today for repeat injection prior to.  [Worsening] : worsening [6] : a current pain level of 6/10 [3] : a minimum pain level of 3/10 [9] : a maximum pain level of 9/10

## 2024-05-09 NOTE — DISCUSSION/SUMMARY
[Medication Risks Reviewed] : Medication risks reviewed [Surgical risks reviewed] : Surgical risks reviewed [de-identified] : 83 y/o F pt presents with advanced lateral compartmental osteoarthritis of the right knee. The nature of her condition and treatment options were discussed in detail. We reviewed the pt's MRI which shows Tricompartmental arthrosis and complex degenerative tears of the medial lateral meniscus. The pt is a future candidate for a right TKA. The surgery was discussed in detail. We discussed conservative treatment such as cortisone injections, HA injections, PT, anti-inflammatories, and low impact exercise. We provided the pt with an aspiration/cortisone injection which she tolerated well. The pt has exhausted conservative treatment which consisted of activity modification, 20 visits of formalized physical therapy, a course of hyaluronic acid injections as well as a corticosteroid injection.  Patient opted for repeat aspiration and injection of the right knee in the office today. She will follsoledad leigh in three months for repeat evaluation.   The patient is a 84 year year old individual with end stage arthritis of their right knee joint. The patient has exhausted a minimum of 3 months conservative treatment including prior injections (cortisone and/or hyaluronic acid injections), physical therapy, over the counter NSAIDS and pain medication where indicated.  In addition, the patient's quality of life is diminished due to significant chronic pain. The patient is having difficulty with activities of daily living, including ambulating, descending stairs, and rising from a seated position. Based upon the patients continued symptoms and failure to respond to conservative treatment, I have recommended a right total knee replacement for this patient. A long discussion took place with the patient describing what a total joint replacement is and what the procedure would entail. A knee model, similar to the implant that will be used during the operation, was utilized to demonstrate and to discuss the various bearing surfaces of the implants. Implant fixation, use of cement, was also discussed with the patient. Choices of implant manufacturers, mainly [employer] and [employer], were discussed and reviewed with preference to be made by patient and surgeon prior to operation. Final selection to be based on customary practice as well as preoperative templating with selection confirmed intraoperatively based on the patient's anatomy. The patient participated and agreed with the decision-making process. The hospitalization and post-operative care and rehabilitation were also discussed. The use of perioperative antibiotics and DVT prophylaxis were discussed. The risk, benefits and alternatives to a surgical intervention were discussed at length with the patient. The patient was also advised of risks related to the medical comorbidities and elevated body mass index (BMI). A lengthy discussion took place to review the most common complications including but not limited to: deep vein thrombosis, pulmonary embolism, heart attack, stroke, infection, wound breakdown, numbness, damage to nerves, tendon, muscles, arteries or other blood vessels, death and other possible complications from anesthesia. The patient was told that we will take steps to minimize these risks by using sterile technique, antibiotics and DVT prophylaxis when appropriate and follow the patient postoperatively in the office setting to monitor progress. The possibility of recurrent pain, no improvement in pain and actual worsening of pain were also discussed with the patient.    The discharge plan of care focused on the patient going home following surgery. The patient was encouraged to make the necessary arrangements to have someone stay with them when they are discharged home. Following discharge, a home care nurse will visit the patient. The home care nurse will open your home care case and request home physical therapy services. Home physical therapy will commence following discharge provided it is appropriate and covered by the health insurance benefit plan.   The benefits of surgery were discussed with the patient including the potential for improving his/her current clinical condition through operative intervention. Alternatives to surgical intervention including continued conservative management were also discussed in detail. All questions were answered to the satisfaction of the patient. The treatment plan of care, as well as a model of a total knee equivalent to the one that will be used for their total joint replacement, was shared with the patient. The patient participated and agreed to the plan of care as well as the use of the recommended implants for their total joint replacement surgery.

## 2024-05-13 ENCOUNTER — NON-APPOINTMENT (OUTPATIENT)
Age: 84
End: 2024-05-13

## 2024-05-20 ENCOUNTER — APPOINTMENT (OUTPATIENT)
Dept: PULMONOLOGY | Facility: CLINIC | Age: 84
End: 2024-05-20
Payer: MEDICARE

## 2024-05-20 VITALS
OXYGEN SATURATION: 97 % | WEIGHT: 148 LBS | HEART RATE: 58 BPM | SYSTOLIC BLOOD PRESSURE: 130 MMHG | RESPIRATION RATE: 16 BRPM | BODY MASS INDEX: 26.22 KG/M2 | HEIGHT: 63 IN | DIASTOLIC BLOOD PRESSURE: 70 MMHG

## 2024-05-20 DIAGNOSIS — Z71.89 OTHER SPECIFIED COUNSELING: ICD-10-CM

## 2024-05-20 PROCEDURE — G2211 COMPLEX E/M VISIT ADD ON: CPT

## 2024-05-20 PROCEDURE — 99213 OFFICE O/P EST LOW 20 MIN: CPT

## 2024-05-20 NOTE — DISCUSSION/SUMMARY
[Obstructive Sleep Apnea] : obstructive sleep apnea [Severe] : severe [Responding to Treatment] : responding to treatment [Alcohol Avoidance] : alcohol avoidance [Sedative Avoidance] : sedative avoidance [Weight Loss Program] : weight loss program [CPAP] : CPAP [Patient] : discussed with the patient [FreeTextEntry1] : Multiple pulmonary nodules most likely postinflammatory in nature.  Follow-up CAT scan is pending and the patient will be contacted with the results. [de-identified] : Patient compliant with CPAP/BiPAP and benefiting from therapy

## 2024-05-20 NOTE — HISTORY OF PRESENT ILLNESS
[Obstructive Sleep Apnea] : obstructive sleep apnea [Lab] : lab [APAP:] : APAP [Nasal mask] : nasal mask [TextBox_4] : 84-year-old female seen today in follow-up of left upper lobe and right lower lobe nodule.  Patient did undergo TTNA of the second lesion which was negative.  Follow-up chest CAT scan was ordered but the results are still pending.  History is positive for renal cell carcinoma diagnosed in March 2022 with the aforementioned incidental lung nodule. [Awakes Unrefreshed] : does not awaken unrefreshed [Awakes with Dry Mouth] : does not awaken with dry mouth [Awakes with Headache] : does not awaken with headache [Daytime Somnolence] : denies daytime somnolence [Nonrestorative Sleep] : denies nonrestorative sleep [Snoring] : no snoring [Tired while Driving] : not tired while driving [TextBox_77] : 1am [TextBox_79] : 8am [TextBox_81] : 15 [TextBox_89] : 1 [TextBox_100] : 9/20/16 [TextBox_108] : 90.2 [TextBox_104] : 10/26/16 [TextBox_125] : 5-20 [TextBox_127] : 4/17/2020 form [TextBox_129] : 5/17/2024 [TextBox_133] : 93.5 [TextBox_137] : 93.5 [TextBox_141] : 7 [TextBox_143] : 8 [TextBox_147] : 0.6 [TextBox_158] : [employer] [TextBox_160] : N20, [employer] [TextBox_162] : 12/2/2021 [ESS] : 5

## 2024-05-20 NOTE — ADDENDUM
[FreeTextEntry1] : Results of the patient's chest CT was received late.  This did represent an increase in the lingula lesion from 10 x 15 mm on her last evaluation to her current level of 12 x 17 mm.  She was given the results and subsequently has been referred to thoracic surgery Dr. Kain King

## 2024-05-20 NOTE — END OF VISIT
[Time Spent: ___ minutes] : I have spent [unfilled] minutes of time on the encounter. [FreeTextEntry3] : Films reviewed on PACS with patient

## 2024-05-20 NOTE — CONSULT LETTER
[Dear  ___] : Dear  [unfilled], [Consult Letter:] : I had the pleasure of evaluating your patient, [unfilled]. [Please see my note below.] : Please see my note below. [Consult Closing:] : Thank you very much for allowing me to participate in the care of this patient.  If you have any questions, please do not hesitate to contact me. [Sincerely,] : Sincerely, [FreeTextEntry3] : Prakash Villarreal MD FCCP\par  Pulmonary/Critical Care/Sleep Medicine\par  Department of Internal Medicine\par  \par  [facility] [employer]\par

## 2024-05-20 NOTE — RESULTS/DATA
[TextEntry] : 11/6/2023: Noncontrast chest CT, [facility] radiology-compared to previous film of 5/1/2023 again noted is a irregular spiculated nodule in the right lower lobe measuring 1.3 x 1.0 cm previously measuring 3.3 and 3.2 cm.  There is an adjacent nodularity and linear atelectasis.  812 x 8 mm in nodularity in the left upper lobe previously measuring 9 x 8 mm.  Possible increase.

## 2024-05-28 PROBLEM — G47.33 SEVERE OBSTRUCTIVE SLEEP APNEA: Status: ACTIVE | Noted: 2020-12-22

## 2024-05-28 PROBLEM — R91.8 MULTIPLE LUNG NODULES ON CT: Status: ACTIVE | Noted: 2022-12-12

## 2024-05-28 NOTE — DATA REVIEWED
[FreeTextEntry1] : CT CHEST WITHOUT CONTRAST from [facility] on 05/13/24 HISTORY:  Lung nodule follow-up  TECHNIQUE: CT of the chest is performed. Contrast Technique: Without  Range/Planes: [place] of lungs to the adrenal glands. Axial, coronal, and sagittal.  One or more of the following dose reduction techniques were used: automated exposure control, adjustment of the mA and/or kV according to patient size, use of iterative reconstruction technique.   COMPARISON:  Multiple studies including 11/6/2023  FINDINGS: CHEST  THYROID: Visualized portion is unremarkable.  LUNGS: Minimal centrilobular emphysema. In the lingula, there is 1.2 x 1.7 cm irregular solid nodule is seen (5:42), which increase in size since 11/6/2023 (1.5 x 1 cm) and since 5/1/2023 (0.8 x 0.9 cm). Scattered linear atelectases in both lungs, not significantly changed since prior study. No new suspicious nodule. No interstitial abnormality.  TRACHEA AND BRONCHI: Unremarkable.  PLEURA: Unremarkable  HEART AND PERICARDIUM: No cardiomegaly or gross chamber enlargement. No pericardial effusion. Moderate coronary calcifications.  VASCULATURE: Unremarkable.  LYMPH NODES AND MEDIASTINUM: Unremarkable.  SOFT TISSUES: Unremarkable.  BONES: Unremarkable.  FINDINGS: VISUALIZED PORTION OF THE ABDOMEN  GASTROESOPHAGEAL JUNCTION: Unremarkable. OTHER ORGANS: Post cholecystectomy. Status post right nephrectomy. 2 cm exophytic left upper pole renal cyst, unchanged and does not require follow-up. Lobulated morphology of the left kidney, unchanged..  Of note, Unless otherwise noted, incidental findings require no additional follow-up. Fleischner 2017 was utilized for any pulmonary nodules mentioned above.  IMPRESSION:   Enlarging 15 mm lingular nodule.Correlation with PET/CT or histology is recommended.      	 CT CHEST WITHOUT CONTRAST from [facility] on 11/06/23 HISTORY:  Lung nodules, follow-up   TECHNIQUE:  CT examination of the chest is obtained without intravenous administration of IV contrast material. Reformatted 3 mm thick coronal and sagittal views are provided. One or more of the following dose reduction techniques were used: automated exposure control, adjustment of the mA and/or kV according to patient size, use of iterative reconstruction technique.   COMPARISON:  Comparison is made with prior CT chests dated 5/1/2023    FINDINGS: Again noted is irregular spiculated nodule in the right lower lobe measuring 1.3 x 1.0 cm (previously measured 1.3 x 1.2 cm) with adjacent nodularity and linear band atelectatic changes overall grossly unchanged allowing for the technical differences . 1.2 x 0.8 cm nodularity in the left upper lobe (previously measured 9 x 8 mm) suggesting possibly increased. Stable scattered bilateral scarring predominantly in bilateral lower lobe. Stable tiny calcified granulomas in the right upper lobe. No new suspicious pulmonary nodules. No acute infiltrate or effusion. Trachea is central and patent. No endobronchial lesions.  Small subcentimeter mediastinal lymph nodes are present. There is no evidence of mediastinal or axillary lymphadenopathy. Evaluation of bob is limited without administration of intravenous contrast. No obvious hilar lymphadenopathy. Thoracic aorta is normal size. There is mild atherosclerosis of aorta. Coronary artery calcifications are present. Again noted is mild cardiomegaly. Trace pericardial effusion unchanged.   No obvious left thyroid nodule.  In the visualized upper abdomen, again noted are incompletely imaged multiple left kidney cysts. Status post cholecystectomy and right nephrectomy.  Suggestion for small hiatal hernia.  Degenerative changes in spine. No suspicious osseous lesions.  IMPRESSION:  Irregular spiculated peripheral right lower lobe nodularity has not significantly changed.  Left upper lobe nodularity has slightly increased now measuring up to 1.2 x 0.8 cm. Follow-up CT chest in 3-6 months versus PET scan.  No developing lymphadenopathy.

## 2024-05-28 NOTE — HISTORY OF PRESENT ILLNESS
[FreeTextEntry1] : Ms. NAIDU is a 84 year old female referred by Dr. Villarreal who presents for consultation. Her past medical history includes HTN, HLD, obesity, severe obstructive sleep apnea (CPAP), paroxysmal SVT, mitral prolapse, renal cancer (March 2022), and former smoker.   She has been followed by pulmonology for her sleep apnea and surveillance imaging for her known lung nodules. She presents to the office today to discuss her most recent imaging notable for a 1.7 cm lingula nodule which displays growth.

## 2024-06-03 ENCOUNTER — APPOINTMENT (OUTPATIENT)
Dept: THORACIC SURGERY | Facility: CLINIC | Age: 84
End: 2024-06-03

## 2024-06-03 DIAGNOSIS — R91.8 OTHER NONSPECIFIC ABNORMAL FINDING OF LUNG FIELD: ICD-10-CM

## 2024-06-03 DIAGNOSIS — G47.33 OBSTRUCTIVE SLEEP APNEA (ADULT) (PEDIATRIC): ICD-10-CM

## 2024-06-28 ENCOUNTER — APPOINTMENT (OUTPATIENT)
Age: 84
End: 2024-06-28
Payer: MEDICARE

## 2024-06-28 VITALS — BODY MASS INDEX: 26.22 KG/M2 | WEIGHT: 148 LBS | HEIGHT: 63 IN

## 2024-06-28 VITALS
SYSTOLIC BLOOD PRESSURE: 180 MMHG | HEIGHT: 63 IN | WEIGHT: 148 LBS | HEART RATE: 58 BPM | DIASTOLIC BLOOD PRESSURE: 70 MMHG | BODY MASS INDEX: 26.22 KG/M2

## 2024-06-28 DIAGNOSIS — M17.11 UNILATERAL PRIMARY OSTEOARTHRITIS, RIGHT KNEE: ICD-10-CM

## 2024-06-28 PROCEDURE — 99213 OFFICE O/P EST LOW 20 MIN: CPT | Mod: 25

## 2024-06-28 PROCEDURE — 20610 DRAIN/INJ JOINT/BURSA W/O US: CPT | Mod: RT

## 2024-12-05 ENCOUNTER — APPOINTMENT (OUTPATIENT)
Dept: PULMONOLOGY | Facility: CLINIC | Age: 84
End: 2024-12-05